# Patient Record
Sex: MALE | Race: WHITE | Employment: UNEMPLOYED | URBAN - METROPOLITAN AREA
[De-identification: names, ages, dates, MRNs, and addresses within clinical notes are randomized per-mention and may not be internally consistent; named-entity substitution may affect disease eponyms.]

---

## 2019-07-23 ENCOUNTER — OFFICE VISIT (OUTPATIENT)
Dept: NEUROLOGY | Age: 54
End: 2019-07-23
Payer: COMMERCIAL

## 2019-07-23 VITALS
BODY MASS INDEX: 30.77 KG/M2 | DIASTOLIC BLOOD PRESSURE: 87 MMHG | HEIGHT: 71 IN | WEIGHT: 219.8 LBS | HEART RATE: 63 BPM | SYSTOLIC BLOOD PRESSURE: 132 MMHG

## 2019-07-23 DIAGNOSIS — G25.81 RESTLESS LEG SYNDROME: ICD-10-CM

## 2019-07-23 DIAGNOSIS — G35 MULTIPLE SCLEROSIS (HCC): Primary | ICD-10-CM

## 2019-07-23 DIAGNOSIS — G35 RELAPSING REMITTING MULTIPLE SCLEROSIS (HCC): ICD-10-CM

## 2019-07-23 PROCEDURE — 3017F COLORECTAL CA SCREEN DOC REV: CPT | Performed by: NURSE PRACTITIONER

## 2019-07-23 PROCEDURE — 1036F TOBACCO NON-USER: CPT | Performed by: NURSE PRACTITIONER

## 2019-07-23 PROCEDURE — 99204 OFFICE O/P NEW MOD 45 MIN: CPT | Performed by: NURSE PRACTITIONER

## 2019-07-23 PROCEDURE — G8427 DOCREV CUR MEDS BY ELIG CLIN: HCPCS | Performed by: NURSE PRACTITIONER

## 2019-07-23 PROCEDURE — G8417 CALC BMI ABV UP PARAM F/U: HCPCS | Performed by: NURSE PRACTITIONER

## 2019-07-23 RX ORDER — ZOLPIDEM TARTRATE 10 MG/1
10 TABLET ORAL NIGHTLY PRN
COMMUNITY

## 2019-07-23 RX ORDER — ZONISAMIDE 100 MG/1
100 CAPSULE ORAL
COMMUNITY
End: 2020-02-25 | Stop reason: SDUPTHER

## 2019-07-23 RX ORDER — DULOXETIN HYDROCHLORIDE 60 MG/1
60 CAPSULE, DELAYED RELEASE ORAL DAILY
COMMUNITY
End: 2020-06-09

## 2019-07-23 RX ORDER — DEXTROAMPHETAMINE SACCHARATE, AMPHETAMINE ASPARTATE, DEXTROAMPHETAMINE SULFATE AND AMPHETAMINE SULFATE 2.5; 2.5; 2.5; 2.5 MG/1; MG/1; MG/1; MG/1
10 TABLET ORAL 2 TIMES DAILY
COMMUNITY

## 2019-07-23 RX ORDER — TIZANIDINE 4 MG/1
TABLET ORAL EVERY 6 HOURS PRN
COMMUNITY

## 2019-07-23 RX ORDER — OXYBUTYNIN CHLORIDE 5 MG/1
10 TABLET ORAL DAILY
COMMUNITY
End: 2021-08-05

## 2019-07-23 RX ORDER — BUPROPION HYDROCHLORIDE 150 MG/1
150 TABLET ORAL EVERY MORNING
COMMUNITY

## 2019-07-23 RX ORDER — ROPINIROLE 2 MG/1
2 TABLET, FILM COATED ORAL DAILY
COMMUNITY

## 2019-07-23 SDOH — HEALTH STABILITY: MENTAL HEALTH: HOW OFTEN DO YOU HAVE A DRINK CONTAINING ALCOHOL?: 2-4 TIMES A MONTH

## 2019-07-23 NOTE — PROGRESS NOTES
clubs or organizations: Not on file     Relationship status: Not on file    Intimate partner violence:     Fear of current or ex partner: Not on file     Emotionally abused: Not on file     Physically abused: Not on file     Forced sexual activity: Not on file   Other Topics Concern    Not on file   Social History Narrative    Not on file       CURRENT MEDICATIONS:     Current Outpatient Medications   Medication Sig Dispense Refill    Ocrelizumab (OCREVUS IV) Infuse intravenously      ocrelizumab (OCREVUS) 300 MG/10ML SOLN injection Infuse 300 mg intravenously every 6 months      zonisamide (ZONEGRAN) 100 MG capsule Take 100 mg by mouth 4 times daily (after meals and at bedtime)      buPROPion (WELLBUTRIN XL) 150 MG extended release tablet Take 150 mg by mouth every morning      DULoxetine (CYMBALTA) 60 MG extended release capsule Take 60 mg by mouth daily      oxybutynin (DITROPAN) 5 MG tablet Take 5 mg by mouth daily      rOPINIRole (REQUIP) 2 MG tablet Take 2 mg by mouth daily      amphetamine-dextroamphetamine (ADDERALL, 10MG,) 10 MG tablet Take 10 mg by mouth 2 times daily.  tiZANidine (ZANAFLEX) 4 MG tablet Take by mouth every 6 hours as needed       zolpidem (AMBIEN) 10 MG tablet Take 10 mg by mouth nightly as needed for Sleep. No current facility-administered medications for this visit.          ALLERGIES:     Allergies   Allergen Reactions    Bactrim [Sulfamethoxazole-Trimethoprim] Anaphylaxis and Hives     swallowing                          REVIEW OF SYSTEMS    CONSTITUTIONAL Weight: absent, Appetite: absent, Fatigue: present      HEENT Ears: normal, Visual disturbance: absent   RESPIRATORY Shortness of breath: absent, Cough: absent   CARDIOVASCULAR Chest pain: absent, Leg swelling :absent      GI Constipation: absent, Diarrhea: absent, Swallowing change: absent       Urinary frequency: present, Urinary urgency: present,    MUSCULOSKELETAL Neck pain: absent, Back pain: absent, Stiffness: absent, Muscle pain: present, Joint pain: present Restless legs: present   DERMATOLOGIC Hair loss: absent, Skin changes: absent   NEUROLOGIC Memory loss: present, Confusion: present, Seizures: absent Trouble walking or imbalance: absent, Dizziness: present, Weakness: present, Numbness: present Tremor: absent, Spasm: present, Speech difficulty: present, Headache: absent, Light sensitivity: absent   PSYCHIATRIC Anxiety: present, Hallucination: absent, Mood disorder: present   HEMATOLOGIC Abnormal bleeding: absent, Anemia: absent,            PHYSICAL EXAMINATION:       Vitals:    07/23/19 1213   BP: 132/87   Pulse: 63                                              .                                                                                                    General Appearance:  Alert, cooperative, no signs of distress, appears stated age   Head:  Normocephalic, no signs of trauma   Eyes:  Conjunctiva/corneas clear;  eyelids intact   Ears:  Normal external ear and canals   Nose: Nares normal, mucosa normal, no drainage    Throat: Lips and tongue normal; teeth normal;  gums normal   Neck: Supple, intact flexion, extension and rotation;   trachea midline;  no adenopathy;   thyroid: not enlarged;   no carotid pulse abnormality   Back:   Symmetric, no curvature, ROM adequate   Lungs:   Respirations unlabored   Heart:  Regular rate and rhythm           Extremities: Extremities normal, no cyanosis, no edema   Pulses: Symmetric over head and neck   Skin: Skin color, texture normal, no rashes, no lesions                                     NEUROLOGIC EXAMINATION    Neurologic Exam    Mental status    Alert and oriented x 3; intact memory with no confusion, speech or language problems; no hallucinations or delusions  Fund of information appropriate for level of education    Cranial nerves    II - visual fields intact to confrontation  III, IV, VI - extra-ocular muscles full: no pupillary defect; no CORA, no

## 2019-08-14 ENCOUNTER — HOSPITAL ENCOUNTER (OUTPATIENT)
Dept: MRI IMAGING | Age: 54
Discharge: HOME | End: 2019-08-16
Payer: COMMERCIAL

## 2019-08-14 DIAGNOSIS — G35 MULTIPLE SCLEROSIS (HCC): ICD-10-CM

## 2019-08-14 PROCEDURE — 72156 MRI NECK SPINE W/O & W/DYE: CPT

## 2019-08-14 PROCEDURE — 70553 MRI BRAIN STEM W/O & W/DYE: CPT

## 2019-08-14 PROCEDURE — A9579 GAD-BASE MR CONTRAST NOS,1ML: HCPCS | Performed by: NURSE PRACTITIONER

## 2019-08-14 PROCEDURE — 72157 MRI CHEST SPINE W/O & W/DYE: CPT

## 2019-08-14 PROCEDURE — 6360000004 HC RX CONTRAST MEDICATION: Performed by: NURSE PRACTITIONER

## 2019-08-14 RX ADMIN — GADOTERIDOL 19 ML: 279.3 INJECTION, SOLUTION INTRAVENOUS at 10:44

## 2019-12-17 ENCOUNTER — OFFICE VISIT (OUTPATIENT)
Dept: NEUROLOGY | Age: 54
End: 2019-12-17
Payer: COMMERCIAL

## 2019-12-17 VITALS
SYSTOLIC BLOOD PRESSURE: 146 MMHG | BODY MASS INDEX: 30.46 KG/M2 | DIASTOLIC BLOOD PRESSURE: 96 MMHG | WEIGHT: 217.6 LBS | HEART RATE: 83 BPM | HEIGHT: 71 IN

## 2019-12-17 DIAGNOSIS — M62.838 MUSCLE SPASMS OF BOTH LOWER EXTREMITIES: ICD-10-CM

## 2019-12-17 DIAGNOSIS — G35 RELAPSING REMITTING MULTIPLE SCLEROSIS (HCC): Primary | ICD-10-CM

## 2019-12-17 DIAGNOSIS — R53.83 OTHER FATIGUE: ICD-10-CM

## 2019-12-17 DIAGNOSIS — G25.81 RESTLESS LEG SYNDROME: ICD-10-CM

## 2019-12-17 PROCEDURE — 1036F TOBACCO NON-USER: CPT | Performed by: NURSE PRACTITIONER

## 2019-12-17 PROCEDURE — G8417 CALC BMI ABV UP PARAM F/U: HCPCS | Performed by: NURSE PRACTITIONER

## 2019-12-17 PROCEDURE — G8484 FLU IMMUNIZE NO ADMIN: HCPCS | Performed by: NURSE PRACTITIONER

## 2019-12-17 PROCEDURE — 99214 OFFICE O/P EST MOD 30 MIN: CPT | Performed by: NURSE PRACTITIONER

## 2019-12-17 PROCEDURE — 3017F COLORECTAL CA SCREEN DOC REV: CPT | Performed by: NURSE PRACTITIONER

## 2019-12-17 PROCEDURE — G8427 DOCREV CUR MEDS BY ELIG CLIN: HCPCS | Performed by: NURSE PRACTITIONER

## 2019-12-17 RX ORDER — MULTIVIT-MIN/IRON/FOLIC ACID/K 18-600-40
1 CAPSULE ORAL DAILY
COMMUNITY
End: 2022-03-15 | Stop reason: SDUPTHER

## 2020-02-25 RX ORDER — ZONISAMIDE 100 MG/1
200 CAPSULE ORAL 2 TIMES DAILY
Qty: 360 CAPSULE | Refills: 1 | Status: SHIPPED | OUTPATIENT
Start: 2020-02-25 | End: 2020-08-03

## 2020-02-25 NOTE — TELEPHONE ENCOUNTER
Pt called in asking if we can refill the Zonegran? He was getting it from Dr Dorothea Cardoza and he is no longer seeing him.

## 2020-03-17 ENCOUNTER — TELEPHONE (OUTPATIENT)
Dept: NEUROLOGY | Age: 55
End: 2020-03-17

## 2020-03-17 NOTE — TELEPHONE ENCOUNTER
Hailee Rivera called in. He will be due for Ocrevus infusion in April. He was getting it at [address] [place] [address] before. he now has Ira this [employer] (his wife's insurance). I explained that we will have to use a [facility] facility for the infusion then, [facility] doesn't take that ins. He said he can go to [email or web address]. Will need ot fax an order to [facility] so they can start the precert process. I did explain that with the Corona virus we are currently asking pt. due now to hold off for 4 weeks or until things slow down. He voiced understanding. he was just wanting to start the process. His insurance ID Is [id number] [facility] [id number] through his wife Johnathan Hurtado.

## 2020-04-14 NOTE — TELEPHONE ENCOUNTER
Denise from  [facility] ([phone]) called the office this afternoon. She is in the process of getting the patient's [place] authorized. Jan Morales stated that the patient's [place] is different, coming through [facility] through [facility]. Ocrevus Rx needs to be sent to [facility]. Patient will then have to give permission [facility] to [facility] [place] ( Attn: Pharm[facility] [address]). Jan Morales stated that she spoke with a Denise at [address] [place] [address] ([phone]) who was very helpful. Laurauma Morales also stated that she would fax me something regarding this. Ocrevus Rx will be generated and sent to Kenyon Barrera for her approval.  I told Jan Morales that I would call the patient and update him on this as well. Call placed to Niko Milena Calix and a message was left on his  asking for a return call.

## 2020-04-15 ENCOUNTER — TELEPHONE (OUTPATIENT)
Dept: NEUROLOGY | Age: 55
End: 2020-04-15

## 2020-04-16 RX ORDER — OCRELIZUMAB 300 MG/10ML
600 INJECTION INTRAVENOUS
Qty: 2 VIAL | Refills: 0 | Status: SHIPPED | OUTPATIENT
Start: 2020-04-16 | End: 2020-10-13

## 2020-04-16 NOTE — TELEPHONE ENCOUNTER
Spoke with Mr. Cherelle Alves this morning and this information was given. Patient stated that he had received a letter from [employer] stating that the Sofya Bible was approved to have done at [address] [place] [address]. I told mr. Cherelle Alves that I would keep him informed. Denise from [facility] did fax information (see4/15/20 info under media tab) pertaining to the Sofya Bible. This was discussed with NENITA Helm. She sent Ocrevus order to [facility] (see order dated 4/16/2020).

## 2020-04-30 ENCOUNTER — TELEPHONE (OUTPATIENT)
Dept: NEUROLOGY | Age: 55
End: 2020-04-30

## 2020-05-01 ENCOUNTER — TELEPHONE (OUTPATIENT)
Dept: ONCOLOGY | Age: 55
End: 2020-05-01

## 2020-05-04 ENCOUNTER — TELEPHONE (OUTPATIENT)
Dept: NEUROLOGY | Age: 55
End: 2020-05-04

## 2020-05-04 NOTE — TELEPHONE ENCOUNTER
Joselin Cohen called in today about his Ocrevus. He said he was on the phone with [facility] today and they are saying they do not have an approval on file. He is very frustrated with this process. Spent 22 minutes on the phone. Will fax copy of the approval to [phone].

## 2020-05-08 ENCOUNTER — TELEPHONE (OUTPATIENT)
Dept: NEUROLOGY | Age: 55
End: 2020-05-08

## 2020-06-09 ENCOUNTER — OFFICE VISIT (OUTPATIENT)
Dept: NEUROLOGY | Age: 55
End: 2020-06-09
Payer: COMMERCIAL

## 2020-06-09 ENCOUNTER — HOSPITAL ENCOUNTER (OUTPATIENT)
Age: 55
Discharge: HOME | End: 2020-06-09
Payer: COMMERCIAL

## 2020-06-09 VITALS
SYSTOLIC BLOOD PRESSURE: 140 MMHG | HEIGHT: 70 IN | WEIGHT: 210 LBS | DIASTOLIC BLOOD PRESSURE: 93 MMHG | HEART RATE: 60 BPM | TEMPERATURE: 97.3 F | BODY MASS INDEX: 30.06 KG/M2

## 2020-06-09 LAB
ALBUMIN SERPL-MCNC: 4.4 G/DL (ref 3.5–5.2)
ALBUMIN/GLOBULIN RATIO: 2 (ref 1–2.5)
ALP BLD-CCNC: 98 U/L (ref 40–129)
ALT SERPL-CCNC: 30 U/L (ref 5–41)
AST SERPL-CCNC: 25 U/L
BILIRUB SERPL-MCNC: 0.55 MG/DL (ref 0.3–1.2)
BILIRUBIN DIRECT: 0.14 MG/DL
BILIRUBIN, INDIRECT: 0.41 MG/DL (ref 0–1)
GLOBULIN: NORMAL G/DL (ref 1.5–3.8)
TOTAL PROTEIN: 6.6 G/DL (ref 6.4–8.3)
VITAMIN D 25-HYDROXY: 68.1 NG/ML (ref 30–100)

## 2020-06-09 PROCEDURE — 82306 VITAMIN D 25 HYDROXY: CPT

## 2020-06-09 PROCEDURE — 3017F COLORECTAL CA SCREEN DOC REV: CPT | Performed by: PSYCHIATRY & NEUROLOGY

## 2020-06-09 PROCEDURE — G8427 DOCREV CUR MEDS BY ELIG CLIN: HCPCS | Performed by: PSYCHIATRY & NEUROLOGY

## 2020-06-09 PROCEDURE — 36415 COLL VENOUS BLD VENIPUNCTURE: CPT

## 2020-06-09 PROCEDURE — 1036F TOBACCO NON-USER: CPT | Performed by: PSYCHIATRY & NEUROLOGY

## 2020-06-09 PROCEDURE — 80076 HEPATIC FUNCTION PANEL: CPT

## 2020-06-09 PROCEDURE — 99214 OFFICE O/P EST MOD 30 MIN: CPT | Performed by: PSYCHIATRY & NEUROLOGY

## 2020-06-09 PROCEDURE — G8417 CALC BMI ABV UP PARAM F/U: HCPCS | Performed by: PSYCHIATRY & NEUROLOGY

## 2020-06-09 NOTE — PROGRESS NOTES
foraminal   narrowing at C4-5, and mild right foraminal narrowing at C5-6.    MRI thoracic spine 8/14/19:       Impression   Unremarkable pre and post-contrast MRI of the thoracic spine and thoracic   spinal cord.       Remote T11 compression deformity         PAST MEDICAL HISTORY:         Diagnosis Date    Insomnia     Migraines     Movement disorder     Multiple sclerosis (HCC)     Neuropathy     hands, feet    Sleep apnea         PAST SURGICAL HISTORY:         Procedure Laterality Date    APPENDECTOMY  26    MOUTH SURGERY  06/2020        SOCIAL HISTORY:     Social History     Socioeconomic History    Marital status:      Spouse name: Not on file    Number of children: Not on file    Years of education: Not on file    Highest education level: Not on file   Occupational History    Not on file   Social Needs    Financial resource strain: Not on file    Food insecurity     Worry: Not on file     Inability: Not on file    Transportation needs     Medical: Not on file     Non-medical: Not on file   Tobacco Use    Smoking status: Never Smoker    Smokeless tobacco: Never Used   Substance and Sexual Activity    Alcohol use: Yes     Frequency: 2-4 times a month     Comment: socially    Drug use: Not Currently    Sexual activity: Not on file   Lifestyle    Physical activity     Days per week: Not on file     Minutes per session: Not on file    Stress: Not on file   Relationships    Social connections     Talks on phone: Not on file     Gets together: Not on file     Attends [employer] service: Not on file     Active member of club or organization: Not on file     Attends meetings of clubs or organizations: Not on file     Relationship status: Not on file    Intimate partner violence     Fear of current or ex partner: Not on file     Emotionally abused: Not on file     Physically abused: Not on file     Forced sexual activity: Not on file   Other Topics Concern    Not on file   Social History Narrative    Not on file       CURRENT MEDICATIONS:     Current Outpatient Medications   Medication Sig Dispense Refill    ocrelizumab (OCREVUS) 300 MG/10ML SOLN injection Infuse 20 mLs intravenously every 6 months 2 vial 0    zonisamide (ZONEGRAN) 100 MG capsule Take 2 capsules by mouth 2 times daily 360 capsule 1    Cholecalciferol (VITAMIN D) 50 MCG (2000 UT) CAPS capsule Take 1 capsule by mouth daily      buPROPion (WELLBUTRIN XL) 150 MG extended release tablet Take 150 mg by mouth every morning      oxybutynin (DITROPAN) 5 MG tablet Take 10 mg by mouth daily       rOPINIRole (REQUIP) 2 MG tablet Take 2 mg by mouth daily      amphetamine-dextroamphetamine (ADDERALL, 10MG,) 10 MG tablet Take 10 mg by mouth 2 times daily.  tiZANidine (ZANAFLEX) 4 MG tablet Take by mouth every 6 hours as needed       zolpidem (AMBIEN) 10 MG tablet Take 10 mg by mouth nightly as needed for Sleep. No current facility-administered medications for this visit.          ALLERGIES:     Allergies   Allergen Reactions    Bactrim [Sulfamethoxazole-Trimethoprim] Anaphylaxis and Hives     swallowing                             REVIEW OF SYSTEMS    CONSTITUTIONAL Weight: present, Appetite: absent, Fatigue: present      HEENT Ears: normal, Visual disturbance: absent   RESPIRATORY Shortness of breath: absent, Cough: absent   CARDIOVASCULAR Chest pain: absent, Leg swelling :absent      GI Constipation: absent, Diarrhea: absent, Swallowing change: absent       Urinary frequency: present, Urinary urgency: present, Urinary incontinence: present   MUSCULOSKELETAL Neck pain: absent, Back pain: absent, Stiffness: present, Muscle pain: present, Joint pain: present Restless legs: present   DERMATOLOGIC Hair loss: present, Skin changes: absent   NEUROLOGIC Memory loss: present, Confusion: present, Seizures: absent Trouble walking or imbalance: present, Dizziness: present, Weakness: present, Numbness: present Tremor: present, Spasm:

## 2020-06-10 ENCOUNTER — TELEPHONE (OUTPATIENT)
Dept: NEUROLOGY | Age: 55
End: 2020-06-10

## 2020-06-10 NOTE — TELEPHONE ENCOUNTER
Nasim Torrez was called to give him his results from Vitamin D and LFT which are within normal range. He stated understanding. Writer inadvertently hit the complete button prior to entering in comment.

## 2020-08-03 RX ORDER — ZONISAMIDE 100 MG/1
CAPSULE ORAL
Qty: 360 CAPSULE | Refills: 1 | Status: SHIPPED | OUTPATIENT
Start: 2020-08-03 | End: 2020-09-17 | Stop reason: SDUPTHER

## 2020-08-03 NOTE — TELEPHONE ENCOUNTER
Pharmacy requesting refill of Zonegran 100 mg . Medication active on med list yes      Date last ordered: 2/25/2020  verified on 8/3/2020  verified by LATRICE Frost LPN      Date of last appointment 6/9/2020    Next Visit Date:  12/15/2020

## 2020-09-17 RX ORDER — ZONISAMIDE 100 MG/1
CAPSULE ORAL
Qty: 360 CAPSULE | Refills: 1 | Status: SHIPPED | OUTPATIENT
Start: 2020-09-17 | End: 2021-01-08 | Stop reason: SDUPTHER

## 2020-09-21 ENCOUNTER — TELEPHONE (OUTPATIENT)
Dept: NEUROLOGY | Age: 55
End: 2020-09-21

## 2020-09-21 NOTE — TELEPHONE ENCOUNTER
Dr. Chelo Mckenna requested a comparison of . 's MRI of Andrei's August 2019 images to be sent over to [address] in order for them to compare and do an addended report for her. Spoke with Sandra Vee at [address]- who will assist in this.

## 2020-10-13 RX ORDER — OCRELIZUMAB 300 MG/10ML
INJECTION INTRAVENOUS
Qty: 20 ML | Refills: 3 | Status: SHIPPED | OUTPATIENT
Start: 2020-10-13 | End: 2020-11-25 | Stop reason: SDUPTHER

## 2020-11-24 ENCOUNTER — TELEPHONE (OUTPATIENT)
Dept: NEUROLOGY | Age: 55
End: 2020-11-24

## 2020-11-24 NOTE — TELEPHONE ENCOUNTER
Tavo Carrero from [address] [place] [address] called , regarding patient infusion that is scheduled next week. They have not received medication and were wondering on the status . She ask me to reach out to [address] [facility] [phone]    Authorization department  Spoke to : Radha Ochoa [facility])      Approval for Akilajamal Johnson # [id number]  10/25/2020-11/24/2021                    Merry Herrera back at [address] cancer [phone] to let her know we need a script send to [facility] fax to [phone] and for her to organize the shipment of the medication. I will forward this to Renée Moreno  Follow up on .

## 2020-11-25 RX ORDER — OCRELIZUMAB 300 MG/10ML
INJECTION INTRAVENOUS
Qty: 20 ML | Refills: 1 | Status: SHIPPED | OUTPATIENT
Start: 2020-11-25 | End: 2020-12-01 | Stop reason: SDUPTHER

## 2020-11-25 NOTE — TELEPHONE ENCOUNTER
I called and was on hold with [facility] to find out about the RX that was sent through in October but had to hang up.  Will send through another electronic RX

## 2020-11-25 NOTE — TELEPHONE ENCOUNTER
Pt called in stating [facility] told him that they have not received a Rx from us for his infusion next week. I told him that we sent the script via eRx back on 10/13/20. He said he is getting very frustrated with [facility]. I told him that we did also receive a fax script form and that I would get it faxed back in as well. He stated his understanding. He said that he was also going to call [facility] back and tell them that it was sent in back in October.

## 2020-11-25 NOTE — TELEPHONE ENCOUNTER
I tried to call [facility] because they should have a RX on file from October.  They wee not open until 7 am Central time

## 2020-11-30 NOTE — TELEPHONE ENCOUNTER
Alexa Christianson Nurse from [facility] called in . They need a new script sent over to them for the infusion of Ocrevus 300 mg. Insurance only approved 300 mg not 600 mg so they can not use the old script.        [facility]   Fax : [phone]

## 2020-11-30 NOTE — TELEPHONE ENCOUNTER
I called [facility] and spoke with Hattie to have them make the change to 600  mg. dose. She advised me that it doesn't come in a 600 mg. dose. It comes 300 mg. and he has an approval for that good through 11/24/2021 and therefore I should call [facility] and have them disp. his refill now. I called Frances. I was on the phone for 16 minutes at which time she told me that the RX had been cancelled because they were not able to speak with the pt. about shipment. So we need to initiate a new RX and also submit a new PA for the 600 mg. dose. I called and spoke with Marco Azevedo in [address] [place] [address]. She stated that it was approved for the full 600 mg dose. Please sign the RX again. I have spoken with Mr. Justino Ashby and he said he has not gotten a call from [address]  but he will stay on top of things.

## 2020-12-01 RX ORDER — OCRELIZUMAB 300 MG/10ML
INJECTION INTRAVENOUS
Qty: 20 ML | Refills: 1 | Status: SHIPPED | OUTPATIENT
Start: 2020-12-01 | End: 2021-10-26 | Stop reason: SDUPTHER

## 2020-12-07 ENCOUNTER — TELEPHONE (OUTPATIENT)
Dept: NEUROLOGY | Age: 55
End: 2020-12-07

## 2020-12-07 NOTE — TELEPHONE ENCOUNTER
Received a fax from [facility] now saying that they can't service the patient for Leah Sings it should go to [facility]. I called Rellas Deandra to see if he had received his Ocrevus infusion yet because we had been working on this last week. He said he is scheduled for 12/9 but he has been waiting to see if Devin Limon gets the shipment from [facility]. He said he would look into it and let me know if we have to do something more. We had received a notice prior to doay's that they were going to ship it out so we are unclear if it is to be shipped or not.

## 2020-12-15 ENCOUNTER — OFFICE VISIT (OUTPATIENT)
Dept: NEUROLOGY | Age: 55
End: 2020-12-15
Payer: COMMERCIAL

## 2020-12-15 VITALS
BODY MASS INDEX: 29.81 KG/M2 | TEMPERATURE: 97.9 F | HEART RATE: 74 BPM | HEIGHT: 70 IN | SYSTOLIC BLOOD PRESSURE: 150 MMHG | WEIGHT: 208.2 LBS | DIASTOLIC BLOOD PRESSURE: 98 MMHG

## 2020-12-15 PROCEDURE — 99214 OFFICE O/P EST MOD 30 MIN: CPT | Performed by: PSYCHIATRY & NEUROLOGY

## 2020-12-15 PROCEDURE — G8417 CALC BMI ABV UP PARAM F/U: HCPCS | Performed by: PSYCHIATRY & NEUROLOGY

## 2020-12-15 PROCEDURE — G8427 DOCREV CUR MEDS BY ELIG CLIN: HCPCS | Performed by: PSYCHIATRY & NEUROLOGY

## 2020-12-15 PROCEDURE — 1036F TOBACCO NON-USER: CPT | Performed by: PSYCHIATRY & NEUROLOGY

## 2020-12-15 PROCEDURE — 3017F COLORECTAL CA SCREEN DOC REV: CPT | Performed by: PSYCHIATRY & NEUROLOGY

## 2020-12-15 PROCEDURE — G8484 FLU IMMUNIZE NO ADMIN: HCPCS | Performed by: PSYCHIATRY & NEUROLOGY

## 2020-12-15 NOTE — PROGRESS NOTES
[facility]  Offices: [address], [place], 309 [facility]  [address] [facility], [address], [place], 183 [facility]  [address] [place] [address] [place], [facility], [facility] [place] [address]  Phone: [phone]  Fax: [phone]    MD Peyton Bolton, MD Rikki Bobo, MD Agustin Shepherd, MD Delgado [facility], CNP    12/15/2020      HISTORY OF PRESENT ILLNESS:       I had the pleasure of seeing Fady Self, who returns for continuing neurologic care for relapsing remitting multiple sclerosis (RRMS).       DISEASE SUMMARY  Date of onset: 2010 (CORA)  Date of diagnosis of MS: 2011  Disease course at onset: Relapsing-Remitting  Current disease course: Relapsing-Remitting  Previous disease therapies: Rebif (took for <1 year due to migraines), copaxone (stopped 3/2011 due to relapse), Tysabri (2011-3/2018, JCV+)  Current disease therapy: Randye Aver (4/2018-present), last infusion 12/9/2020  CSF: none  JCV serology result and date:  2018 + 0.33  Vitamin D: 68.1 (6/2020)  Smoking status: never  EDSS: 1.5  T25W: 6.27  9HPT: 37.4     He is on Ocrevus, last infusion was on 12/9/2020 at [facility].  Patient reports he has had issues with his specialty pharmacy shipping medication to his infusion center, but otherwise denies any side effects with the medication. He reports he continues to do relatively well, with no new or worsening symptoms. He had an MRI of the brain done in September which showed no enhancement, but comparison from his prior MRI from 2019 has not been done. He has no known spinal cord lesions. He reports recent left leg cellulitis that has been treated successfully with antibiotics, denies any other recent infections or hospitalizations. He does have multiple symptoms at baseline, as follows:      1. Fatigue: This is 1 of his most disabling symptoms and it is thankfully well controlled on Adderall 10 mg twice daily.   He takes his first dose diffusion or contrast enhancement to suggest active flareup.  Incidental finding of a 8mm cystic pineal gland. MRI cervical spine 8/14/19:  Impression   Unremarkable pre and post-contrast MRI evaluation of the spinal cord.       Mild multilevel degenerative disc disease with uncovertebral and facet   hypertrophy with mild left foraminal narrowing at C3-4, mild right foraminal   narrowing at C4-5, and mild right foraminal narrowing at C5-6.    MRI thoracic spine 8/14/19:       Impression   Unremarkable pre and post-contrast MRI of the thoracic spine and thoracic   spinal cord.       Remote T11 compression deformity         PAST MEDICAL HISTORY:         Diagnosis Date    Insomnia     Migraines     Movement disorder     Multiple sclerosis (HCC)     Neuropathy     hands, feet    Sleep apnea         PAST SURGICAL HISTORY:         Procedure Laterality Date    APPENDECTOMY  26    MOUTH SURGERY  06/2020        SOCIAL HISTORY:     Social History     Socioeconomic History    Marital status:      Spouse name: Not on file    Number of children: Not on file    Years of education: Not on file    Highest education level: Not on file   Occupational History    Not on file   Social Needs    Financial resource strain: Not on file    Food insecurity     Worry: Not on file     Inability: Not on file    Transportation needs     Medical: Not on file     Non-medical: Not on file   Tobacco Use    Smoking status: Never Smoker    Smokeless tobacco: Never Used   Substance and Sexual Activity    Alcohol use: Yes     Frequency: 2-4 times a month     Comment: socially    Drug use: Not Currently    Sexual activity: Not on file   Lifestyle    Physical activity     Days per week: Not on file     Minutes per session: Not on file    Stress: Not on file   Relationships    Social connections     Talks on phone: Not on file     Gets together: Not on file     Attends Jainism service: Not on file     Active member of club or organization: Not on file     Attends meetings of clubs or organizations: Not on file     Relationship status: Not on file    Intimate partner violence     Fear of current or ex partner: Not on file     Emotionally abused: Not on file     Physically abused: Not on file     Forced sexual activity: Not on file   Other Topics Concern    Not on file   Social History Narrative    Not on file       FAMILY MEDICAL HISTORY:     Family History   Problem Relation Age of Onset    Heart Disease Mother     Mult Sclerosis Sister         diseased from MS    Cancer Maternal Grandmother         breast        CURRENT MEDICATIONS:     Current Outpatient Medications   Medication Sig Dispense Refill    ocrelizumab (OCREVUS) 300 MG/10ML SOLN injection INFUSE 600 mg  INTRAVENOUSLY EVERY 6 MONTHS 20 mL 1    zonisamide (ZONEGRAN) 100 MG capsule TAKE 2 CAPSULES TWICE A  capsule 1    Cholecalciferol (VITAMIN D) 50 MCG (2000 UT) CAPS capsule Take 1 capsule by mouth daily      buPROPion (WELLBUTRIN XL) 150 MG extended release tablet Take 150 mg by mouth every morning      oxybutynin (DITROPAN) 5 MG tablet Take 10 mg by mouth daily       rOPINIRole (REQUIP) 2 MG tablet Take 2 mg by mouth daily      amphetamine-dextroamphetamine (ADDERALL, 10MG,) 10 MG tablet Take 10 mg by mouth 2 times daily.  tiZANidine (ZANAFLEX) 4 MG tablet Take by mouth every 6 hours as needed       zolpidem (AMBIEN) 10 MG tablet Take 10 mg by mouth nightly as needed for Sleep. No current facility-administered medications for this visit. ALLERGIES:     Allergies   Allergen Reactions    Bactrim [Sulfamethoxazole-Trimethoprim] Anaphylaxis and Hives     swallowing                             REVIEW OF SYSTEMS     All items selected indicate a positive finding. Those items not selected are negative.   Constitutional [] Weight loss/gain   [x] Fatigue  [] Fever/Chills   HEENT [] Hearing Loss  [] Visual Disturbance  [] Tinnitus  [] Eye pain   Respiratory [] Shortness of Breath  [] Cough  [] Snoring   Cardiovascular [] Chest Pain  [] Palpitations  [] Lightheaded   GI [] Constipation  [] Diarrhea  [] Swallowing change    [x] Urinary Frequency  [x] Urinary Urgency   Musculoskeletal [x] Neck pain  [x] Back pain  [x] Muscle pain  [] Restless legs   Dermatologic [] Skin changes   Neurologic [] Memory loss/confusion  [] Seizures  [] Trouble walking or imbalance  [] Dizziness  [] Weakness  [] Numbness  [] Tremors  [] Speech Difficulty  [] Headaches  [] Light Sensitivity  [] Sound Sensitivity   Endocrinology []Excessive thirst  []Excessive hunger   Psychiatric [] Anxiety/Depression  [] Hallucination   Allergy/immunology []Hives/environmental allergies   Hematologic/lymph [] Abnormal bleeding  [] Abnormal bruising         PHYSICAL EXAMINATION:       Vitals:    12/15/20 0921   BP: (!) 150/98   Pulse:    Temp: 97.9 °F (36.6 °C)                                              .                                                                                                     General Appearance:  Alert, cooperative, no signs of distress, appears stated age   Head:  Normocephalic, no signs of trauma   Eyes:  Conjunctiva/corneas clear;  eyelids intact   Ears:  Normal external ear and canals   Nose: Nares normal, mucosa normal, no drainage    Throat: Lips and tongue normal; teeth normal;  gums normal   Neck: Supple, intact flexion, extension and rotation;   trachea midline;  no adenopathy;   thyroid: not enlarged;   no carotid pulse abnormality   Back:   Symmetric, no curvature, ROM adequate   Lungs:   Respirations unlabored   Heart:  Regular rate and rhythm           Extremities: Extremities normal, no cyanosis, no edema   Pulses: Symmetric over head and neck   Skin: Skin color, texture normal, no rashes, no lesions                                     NEUROLOGIC EXAMINATION      Mental status    Alert and oriented x 3; intact memory with no confusion, speech or language problems; no hallucinations or delusions  Fund of information appropriate for level of education    Cranial nerves    II - visual fields intact to confrontation , fundoscopy showed no  papiledema                                                III, IV, VI - extra-ocular muscles full: no pupillary defect; no CORA, no nystagmus, no ptosis   V - normal facial sensation                                                               VII - normal facial symmetry                                                             VIII - intact hearing                                                                             IX, X - symmetrical palate                                                                  XI - symmetrical shoulder shrug                                                       XII - tongue midline without atrophy or fasciculation      Motor function  Normal muscle bulk and tone; strength 5/5 on all 4 extremities, no pronator drift      Sensory function  decreased to light touch and pinprick on the right upper extremity and left lower extremity      Cerebellar Intact fine motor movement. No involuntary movements or tremors. No ataxia or dysmetria on finger to nose or heel to shin testing      Reflex function DTR 2+ on bilateral UE, 3+ on R patella and 2+ on R achilles, 2+ on L patella and 1+ on L achilles. Negative       Gait                   normal base and arm swing              ASSESSMENT AND PLAN:       This is a 48 y.o. male who comes in for follow up for a history of relapsing remitting multiple sclerosis, currently stable on Ocrevus.     1. [facility]  2.  Advised patient that if he would like to get the Covid vaccine, would wait at least 4 months from his last Ocrevus treatment so that he can have an adequate immune reaction    Follow-up in 6 months      Ángel Spencer MD  Neurology and Sleep [facility]    Please note that this chart was generated using voice recognition Dragon dictation software. Although every effort was made to ensure the accuracy of this automated transcription, some errors in transcription may have occurred.

## 2020-12-23 ENCOUNTER — TELEPHONE (OUTPATIENT)
Dept: NEUROLOGY | Age: 55
End: 2020-12-23

## 2020-12-23 NOTE — TELEPHONE ENCOUNTER
[facility] faxed a RX renewal for Ocrevus. It is not needed. He receive his dose in December and he actually is getting through [address] I faxed back to please cancel.

## 2021-01-07 NOTE — TELEPHONE ENCOUNTER
Pharmacy requesting refill of Zonisamide 100 mg. Medication active on med list yes      Date last ordered: 9/17/2020  verified on 1/7/2021  verified by LATRICE Frost LPN      Date of last appointment 12/15/2020    Next Visit Date:  6/15/2021

## 2021-01-08 RX ORDER — ZONISAMIDE 100 MG/1
CAPSULE ORAL
Qty: 360 CAPSULE | Refills: 1 | Status: SHIPPED | OUTPATIENT
Start: 2021-01-08 | End: 2021-11-04

## 2021-06-15 ENCOUNTER — HOSPITAL ENCOUNTER (OUTPATIENT)
Age: 56
Setting detail: SPECIMEN
Discharge: HOME | End: 2021-06-15
Payer: COMMERCIAL

## 2021-06-15 LAB
IGA: 80 MG/DL (ref 70–400)
IGG: 611 MG/DL (ref 700–1600)
IGM: <25 MG/DL (ref 40–230)

## 2021-06-16 LAB
2000687N OAK TREE IGE: <0.1 KU/L (ref 0–0.34)
ALLERGEN BERMUDA GRASS IGE: <0.1 KU/L (ref 0–0.34)
ALLERGEN BIRCH IGE: <0.1 KU/L (ref 0–0.34)
ALLERGEN DOG DANDER IGE: <0.1 KU/L (ref 0–0.34)
ALLERGEN GERMAN COCKROACH IGE: <0.1 KU/L (ref 0–0.34)
ALLERGEN HELMINTHOSPORIUM HALODES: <0.1 KU/L (ref 0–0.34)
ALLERGEN HORMODENDRUM IGE: <0.1 KUL/L (ref 0–0.34)
ALLERGEN JOHNSON GRASS IGE: <0.1 KU/L (ref 0–0.34)
ALLERGEN LAMB'S QUARTER IGE: <0.1 KU/L (ref 0–0.34)
ALLERGEN PHOMA BETAE: <0.1 KU/L (ref 0–0.34)
ALLERGEN PIGWEED ROUGH IGE: <0.1 KU/L (ref 0–0.34)
ALLERGEN PINE WHITE IGE: <0.1 KU/L (ref 0–0.34)
ALLERGEN SHEEP SORREL (W18) IGE: <0.1 KU/L (ref 0–0.34)
ALLERGEN STEMPHYLIUM HERBARUM IGE: <0.1 KU/L (ref 0–0.34)
ALLERGEN TREE SYCAMORE: <0.1 KU/L (ref 0–0.34)
ALLERGEN WALNUT TREE IGE: <0.1 KU/L (ref 0–0.34)
ALTERNARIA ALTERNATA: <0.1 KU/L (ref 0–0.34)
ASPERGILLUS FUMIGATUS: <0.1 KU/L (ref 0–0.34)
CANDIDA ALBICANS IGE: <0.1 KU/L (ref 0–0.34)
CAT DANDER ANTIBODY: <0.1 KU/L (ref 0–0.34)
COTTONWOOD TREE: <0.1 KU/L (ref 0–0.34)
D. FARINAE: <0.1 KU/L (ref 0–0.34)
D. PTERONYSSINUS: <0.1 KU/L (ref 0–0.34)
ELM TREE: <0.1 KU/L (ref 0–0.34)
FUSARIUM MONILIFORME: <0.1 KU/L (ref 0–0.34)
IGE: 2 IU/ML
MAPLE/BOXELDER TREE: <0.1 KU/L (ref 0–0.34)
P. NOTATUM: <0.1 KU/L (ref 0–0.34)
SHORT RAGWD(A ARTEMIS.) IGE: <0.1 KU/L (ref 0–0.34)
TIMOTHY GRASS: <0.1 KU/L (ref 0–0.34)

## 2021-06-18 LAB
DIPHTHERIA IGG AB: 0.2 IU/ML
TETANUS IGG AB: 1.6 IU/ML

## 2021-06-20 LAB
PNEUMOCOCCAL ANTIBODY TYPE 12: 1.16 UG/ML
PNEUMOCOCCAL ANTIBODY TYPE 14: 0.21 UG/ML
PNEUMOCOCCAL ANTIBODY TYPE 18: 1.61 UG/ML
PNEUMOCOCCAL ANTIBODY TYPE 19F: 0.64 UG/ML
PNEUMOCOCCAL ANTIBODY TYPE 1: 0.12 UG/ML
PNEUMOCOCCAL ANTIBODY TYPE 23: 1.46 UG/ML
PNEUMOCOCCAL ANTIBODY TYPE 3: 0.64 UG/ML
PNEUMOCOCCAL ANTIBODY TYPE 4: 0.18 UG/ML
PNEUMOCOCCAL ANTIBODY TYPE 5: 0.2 UG/ML
PNEUMOCOCCAL ANTIBODY TYPE 6B: 0.12 UG/ML
PNEUMOCOCCAL ANTIBODY TYPE 7F: 0.71 UG/ML
PNEUMOCOCCAL ANTIBODY TYPE 8: 0.64 UG/ML
PNEUMOCOCCAL ANTIBODY TYPE 9N: 0.16 UG/ML
PNEUMOCOCCAL ANTIBODY TYPE 9V: 0.54 UG/ML
PNEUMOCOCCAL INTERPRETATION: NORMAL
S. PNEUM TYPE 19A,IGG: 0.15 UG/ML
S. PNEUM TYPE 2,IGG: 1.12 UG/ML
S. PNEUM TYPE 20,IGG: 1.02 UG/ML
S. PNEUM TYPE 22F,IGG: 0.86 UG/ML
S. PNEUM TYPE 33F,IGG: 0.19 UG/ML
STREP PNEUMO TYPE 10A: 0.08 UG/ML
STREP PNEUMO TYPE 11A: 0.14 UG/ML
STREP PNEUMO TYPE 15B: 0.04 UG/ML
STREP PNEUMO TYPE 17F: 1.43 UG/ML

## 2021-08-05 ENCOUNTER — OFFICE VISIT (OUTPATIENT)
Dept: NEUROLOGY | Age: 56
End: 2021-08-05
Payer: COMMERCIAL

## 2021-08-05 VITALS
BODY MASS INDEX: 31.11 KG/M2 | DIASTOLIC BLOOD PRESSURE: 94 MMHG | SYSTOLIC BLOOD PRESSURE: 145 MMHG | WEIGHT: 222.2 LBS | HEIGHT: 71 IN | HEART RATE: 76 BPM

## 2021-08-05 DIAGNOSIS — R20.2 NUMBNESS AND TINGLING: ICD-10-CM

## 2021-08-05 DIAGNOSIS — E07.9 THYROID DISEASE: ICD-10-CM

## 2021-08-05 DIAGNOSIS — R20.0 NUMBNESS AND TINGLING: ICD-10-CM

## 2021-08-05 DIAGNOSIS — Z79.899 HIGH RISK MEDICATION USE: ICD-10-CM

## 2021-08-05 DIAGNOSIS — R41.3 MEMORY DIFFICULTIES: ICD-10-CM

## 2021-08-05 DIAGNOSIS — G35 RELAPSING REMITTING MULTIPLE SCLEROSIS (HCC): Primary | ICD-10-CM

## 2021-08-05 DIAGNOSIS — E53.9 VITAMIN B DEFICIENCY: ICD-10-CM

## 2021-08-05 PROCEDURE — G8417 CALC BMI ABV UP PARAM F/U: HCPCS | Performed by: PSYCHIATRY & NEUROLOGY

## 2021-08-05 PROCEDURE — 1036F TOBACCO NON-USER: CPT | Performed by: PSYCHIATRY & NEUROLOGY

## 2021-08-05 PROCEDURE — G8427 DOCREV CUR MEDS BY ELIG CLIN: HCPCS | Performed by: PSYCHIATRY & NEUROLOGY

## 2021-08-05 PROCEDURE — 99215 OFFICE O/P EST HI 40 MIN: CPT | Performed by: PSYCHIATRY & NEUROLOGY

## 2021-08-05 PROCEDURE — 3017F COLORECTAL CA SCREEN DOC REV: CPT | Performed by: PSYCHIATRY & NEUROLOGY

## 2021-08-05 RX ORDER — GABAPENTIN 300 MG/1
300 CAPSULE ORAL DAILY
COMMUNITY
Start: 2021-07-30

## 2021-08-05 RX ORDER — OXYBUTYNIN CHLORIDE 10 MG/1
10 TABLET, EXTENDED RELEASE ORAL DAILY
COMMUNITY
Start: 2021-07-20

## 2021-08-05 NOTE — PROGRESS NOTES
NEUROLOGY CONSULT  Patient Name:       Blas Dang  :        1965  Clinic Visit Date:    2021      Dear Dr. Jamaal Baxter MD     I had the opportunity to see your patient, . Blas Dang in neurology consultation today. As you know he  is a 55 y.o. right handed  retired  male  seen in the clinic today for first time by me as new patient. This patient is transitioning from the care of my colleague, Dr. Tanya Pablo, who relocated from our practice. He stated that he was diagnosed with multiple sclerosis in  with eye problems in the past.  He has had MRI brain and spine studies and was diagnosed with multiple sclerosis. Never had spinal fluid studies. He was on Rebif earlier but he stopped it due to headaches. He was also on Copaxone and stopped it in 2011 due to relapses. Later onwards he was on Tysabri for few years. Then it was switched to IV ocrelizumab because of positive JCV. He has been tolerating IV eculizumab well without any adverse effects such as skin or respiratory infections, infusion related reactions. Has not had any neutropenias. He has been doing well without any exacerbations of multiple sclerosis and tolerated ocrelizumab well without any infusion reactions. He does feel right lower extremity weakness and also has memory complaints along with numbness, weakness and gait difficulties as stated. He has significantly unprovoked fatigue. Conservative measures did not help. He has been on Adderall and he has been able to tolerate fatigue well. He admits to having memory difficulties, predominantly short-term memory but he is completely independent of all ADLs. He is still driving without any problems. Also stated that he used to work as a full time  but stopped working because of [facility]. Denies anxiety and depression. Denies urinary dysfunction.     He has had numbness and tingling in right lower extremity and left groin for which he has had extensive work-up at [facility], [facility] clinic in the past and those were unrevealing. Tried gabapentin and amitriptyline with no relief. Presently on zonisamide and tizanidine for painful sensations and spasms. Denies numbness but admits to right lower extremity weakness as stated above. Denies blurred vision and diplopia. He is a retired . DISEASE SUMMARY  Date of onset: 2010  Date of diagnosis of MS: 2011  Disease course at onset: Relapsing-Remitting  Current disease course: Relapsing-Remitting  Previous disease therapies: Relief (took for few months; stopped due to headaches), Copaxone (stopped March 2011 due to relapse), Tysabri (2011-March 2018, JCV plus)  Current disease therapy: IV ocrelizumab (04/2018-present), last infusion   CSF: None  JCV serology result and date: (2018) + 0.33  Vitamin D: 68.1 (06/2020)  Smoking status: Never  EDSS: 1.5  9HPT: 37.4  T25W: 6.27     Review of systems done by staff reviewed and pertinent positives include numbness, tingling, weakness, gait difficulties, sleep disturbance, etc. as stated above. Current Outpatient Medications on File Prior to Visit   Medication Sig Dispense Refill    zonisamide (ZONEGRAN) 100 MG capsule TAKE 2 CAPSULES TWICE A  capsule 1    ocrelizumab (OCREVUS) 300 MG/10ML SOLN injection INFUSE 600 mg  INTRAVENOUSLY EVERY 6 MONTHS 20 mL 1    Cholecalciferol (VITAMIN D) 50 MCG (2000 UT) CAPS capsule Take 1 capsule by mouth daily      buPROPion (WELLBUTRIN XL) 150 MG extended release tablet Take 150 mg by mouth every morning      oxybutynin (DITROPAN) 5 MG tablet Take 10 mg by mouth daily       rOPINIRole (REQUIP) 2 MG tablet Take 2 mg by mouth daily      amphetamine-dextroamphetamine (ADDERALL, 10MG,) 10 MG tablet Take 10 mg by mouth 2 times daily.  tiZANidine (ZANAFLEX) 4 MG tablet Take by mouth every 6 hours as needed       zolpidem (AMBIEN) 10 MG tablet Take 10 mg by mouth nightly as needed for Sleep.        No current limbs   REFLEX FUNCTION:  Symmetric 2+ DTRs in biceps, brachioradialis, triceps; brisk right patellar and right Achilles reflex; 1+ left patellar and left Achilles reflex with bilateral flexor plantar response. STATION and GAIT  Normal station, normal gait, difficulties with tandem gait        8/5/2021  Maximum score 5 Score 5 What is the year, season, date, day, month   Maximum score 5 Score 5 Where are we: State, county, town, hospital, floor? Maximum score 3 Score 3 Name 3 objects: ball, pen, car. Ask patient to repeat 3 objects. Maximum score 5 Score 5 Serial sevens from 100:93, 86, 79, 72, 65   Maximum score 3 Score 3 Recall 3 objects   Maximum score 2 Score 2 Name a pencil and watch. Maximum score 1 Score 1 Repeat the following: No ifs ands or buts.      Maximum score 3 Score 3 Follow 3 stage command take a paper in your hand, fold it in half, and put it on the floor. Maximum score 1  Score 1 Read and obey the following: Close your eyes     Maximum score 1 Score 1 Write a sentence. Maximum score 1 Score 1 Copy a design below. Max 30   Patients score 30            Diagnostic data reviewed with the patient:   MRI brain (with/without) 9/17/2020: Scattered foci of hyperintensity involving periventricular and subcortical deep white matter with involvement of callosal septal junction. Findings consistent with a demyelinating process such as multiple sclerosis. No restricted diffusion or contrast enhancement to suggest active flareup. MRI cervical spine (with/without) 8/14/2019: Unremarkable pre and postcontrast MRI evaluation of spinal cord. Mild multilevel degenerative disc disease with uncovertebral and facet hypertrophy with mild left foraminal narrowing at C3-4, mild right foraminal narrowing at C4-5 and mild right foraminal narrowing at C5-6.     MRI thoracic spine (with/without) 8/14/2019: Unremarkable pre and postcontrast MRI of the thoracic spine and thoracic spinal cord.  Remote T1 compression deformity. CBC:   No results found for: WBC, PLT   BMP:   No results found for: NA, K, GLUCOSE, CALCIUM      Lab Results   Component Value Date    ALT 30 06/09/2020    AST 25 06/09/2020     Lab Results   Component Value Date    VITD25 68.1 06/09/2020       IVIG done through [facility] (5/17/2021): 582 (700-1600)              Impression and Plan: Mr. Alfie Flores is a 55 y.o. male with   Relapsing remitting multiple sclerosis since 2010; on disease modifying therapy with IV ocrelizumab.,  Multiple sclerosis has been stable without any exacerbations. For his ongoing subjective memory difficulties (scored 30/30 on MMSE) will get B12, folate, TSH to rule out any treatable etiologies. Will also check for LFT, BMP, EEG and proceed accordingly. Unprovoked fatigue: Has been on Adderall 10 mg twice daily. Bladder dysfunction: Stable on oxybutynin. Painful sensations and muscle spasms: On zonisamide 200 bid and tizanidine 4mg q6hr prn. He was also advised that I will check blood tests and scans done so far and will call him if he needs any further testing before next visit. I personally spent total of > 40 min from 8:20 AM to 9 AM with the patient while interviewing the patient, examining the patient and reviewing the data. Greater than 50% of visit was spent explaining the rationale for diagnosis and treatment. Follow-up in clinic in 3 months or sooner for further questions and concerns. Please note that portions of this note were completed with a voice recognition program.  Although every effort was made to ensure the accuracy of this automated transcription, some errors in transcription may have occurred; occasionally words are mis-transcribed. Thank you for understanding.

## 2021-08-06 ENCOUNTER — TELEPHONE (OUTPATIENT)
Dept: NEUROLOGY | Age: 56
End: 2021-08-06

## 2021-08-06 NOTE — TELEPHONE ENCOUNTER
----- Message from Tanya Carias MD sent at 8/6/2021  6:18 AM EDT -----  Regarding: blood work  Please let the patient know that I did put in some other blood work today after I reviewed his prior testing. Patient is aware that I will be asking for additional blood work upon reviewing prior testing. Please let him know.   Thank you so much    -dr. Marcela Muhammad

## 2021-08-27 ENCOUNTER — HOSPITAL ENCOUNTER (OUTPATIENT)
Age: 56
Setting detail: SPECIMEN
Discharge: HOME | End: 2021-08-27
Payer: COMMERCIAL

## 2021-08-27 DIAGNOSIS — E07.9 THYROID DISEASE: ICD-10-CM

## 2021-08-27 DIAGNOSIS — E53.9 VITAMIN B DEFICIENCY: ICD-10-CM

## 2021-08-27 LAB
FOLATE: 9.6 NG/ML
TSH SERPL DL<=0.05 MIU/L-ACNC: 2.17 MIU/L (ref 0.3–5)
VITAMIN B-12: 409 PG/ML (ref 232–1245)

## 2021-09-01 LAB — VITAMIN B6: 73.1 NMOL/L (ref 20–125)

## 2021-10-26 RX ORDER — OCRELIZUMAB 300 MG/10ML
INJECTION INTRAVENOUS
Qty: 20 ML | Refills: 1 | Status: SHIPPED | OUTPATIENT
Start: 2021-10-26

## 2021-11-02 ENCOUNTER — TELEPHONE (OUTPATIENT)
Dept: NEUROLOGY | Age: 56
End: 2021-11-02

## 2021-11-02 NOTE — TELEPHONE ENCOUNTER
Yfn Novoa returned my call. He is going to [address] [place] [address] for his Ocrevus and will see them on occasion but does want to  keep his appointment with Dr. Scott Parr so he can have a local neurologist still.

## 2021-11-02 NOTE — TELEPHONE ENCOUNTER
Received a fax from [address] [place] [address] for refill of Ocrevus infusion. Reviewed his chart his 8/26/21 visit with Paul Dunlap at [address] [place] [address]  \"ASSESSMENT:  Marisabel Huynh is a 55year old with RRMS stably disabled on [address] [place] [address] which he tolerates well. No new neurological symptoms. He transitioned his [facility]cre[facility]us infusions from [facility] to [facility] and prefers to continue treatments within [facility]. He did see immunology due to low IGM <7, immunology OKd continuing ocrevus. Will repeat IGM & IGG as well as CBC & CMP in December. He will also be due for brain & CS MRI in December as well. He continues to struggle with ongoing fatigue and poor sleep, will consult health psychology to discuss fatigue and sleep management protocols. \"       I called and lm for Claude Bass to see if he is planning on keeping his 11/4/21 visit with Dr. Farideh Mora or cancelling and that I am notifying [facility] we are not managing [address] [place] [address].

## 2021-11-04 ENCOUNTER — TELEMEDICINE (OUTPATIENT)
Dept: NEUROLOGY | Age: 56
End: 2021-11-04
Payer: COMMERCIAL

## 2021-11-04 DIAGNOSIS — R41.3 MEMORY DIFFICULTIES: ICD-10-CM

## 2021-11-04 DIAGNOSIS — Z79.899 HIGH RISK MEDICATION USE: ICD-10-CM

## 2021-11-04 DIAGNOSIS — G35 RELAPSING REMITTING MULTIPLE SCLEROSIS (HCC): Primary | ICD-10-CM

## 2021-11-04 DIAGNOSIS — R20.2 NUMBNESS AND TINGLING: ICD-10-CM

## 2021-11-04 DIAGNOSIS — R20.0 NUMBNESS AND TINGLING: ICD-10-CM

## 2021-11-04 PROCEDURE — G8427 DOCREV CUR MEDS BY ELIG CLIN: HCPCS | Performed by: PSYCHIATRY & NEUROLOGY

## 2021-11-04 PROCEDURE — 3017F COLORECTAL CA SCREEN DOC REV: CPT | Performed by: PSYCHIATRY & NEUROLOGY

## 2021-11-04 PROCEDURE — 99214 OFFICE O/P EST MOD 30 MIN: CPT | Performed by: PSYCHIATRY & NEUROLOGY

## 2021-11-04 NOTE — PROGRESS NOTES
NEUROLOGY FOLLOW-UP VISIT   Patient Name:       Deandre Ca  :        1965  Clinic Visit Date:    2021  LOV: 21      Dear Dr. Susana Smith MD     I saw Mr. Deandre Ca in follow-up in the office today in continuation of neurologic care. As you know he  is a 55 y.o. right handed  retired  male  seen in the clinic today as a first follow up visit. He is transitioning from the care of my colleague, Dr. Kingsley Milan. He has Relapsing remitting multiple sclerosis since ; on disease modifying therapy with IV ocrelizumab. He transitioned his ocrevus infusions from [facility] to [facility] and prefers to continue treatments within [facility]. He did see hematology/ immunology due to low IgM <7, immunology OKd continuing ocrevus. IGM & IGG & CBC & CMP are being repeated in December. He is also due for brain & Cervical Spine MRI in December as well. He continues to struggle with ongoing fatigue and poor sleep. He is being referred to specialist to discuss fatigue and sleep management protocols. Admits to having ongoing short-term memory difficulties. He is completely independent of all ADLs. He has been driving without any problems. But he is concerned about short-term memory difficulties. He stated that he was diagnosed with multiple sclerosis in  with eye problems in the past.  He has had MRI brain and spine studies and was diagnosed with multiple sclerosis. Never had spinal fluid studies. He was on Rebif earlier but he stopped it due to headaches. He was also on Copaxone and stopped it in 2011 due to relapses. Later onwards he was on Tysabri for few years. Then it was switched to IV ocrelizumab because of positive JCV. He has been tolerating IV eculizumab well without any adverse effects such as skin or respiratory infections, infusion related reactions. Has not had any neutropenias.    He has been doing well without any exacerbations of multiple sclerosis and tolerated ocrelizumab well without any infusion reactions. He does feel right lower extremity weakness and also has memory complaints along with numbness, weakness and gait difficulties as stated. He has significantly unprovoked fatigue. Conservative measures did not help. He has been on Adderall and he has been able to tolerate fatigue well. He admits to having memory difficulties, predominantly short-term memory but he is completely independent of all ADLs. He is still driving without any problems. Also stated that he used to work as a full time  but stopped working because of [address]. Denies anxiety and depression. Denies urinary dysfunction. He has had numbness and tingling in right lower extremity and left groin for which he has had extensive work-up at [facility] in the past and those were unrevealing. Tried gabapentin and amitriptyline with no relief. Presently on zonisamide and tizanidine for painful sensations and spasms. Denies numbness but admits to right lower extremity weakness as stated above. Denies blurred vision and diplopia. He is a retired . DISEASE SUMMARY  Date of onset: 2010  Date of diagnosis of MS: 2011  Disease course at onset: Relapsing-Remitting  Current disease course: Relapsing-Remitting  Previous disease therapies: Relief (took for few months; stopped due to headaches), Copaxone (stopped March 2011 due to relapse), Tysabri (2011-March 2018, JCV plus)  Current disease therapy: IV ocrelizumab (04/2018-present), last infusion   CSF: None  JCV serology result and date: (2018) + 0.33  Vitamin D: 68.1 (06/2020)  Smoking status: Never  EDSS: 1.5  9HPT: 37.4  T25W: 6.27       All items selected indicate a positive finding. Those items not selected are negative.   Constitutional [] Weight loss/gain   [x] Fatigue/ sleep disturbance  [] Fever/Chills   HEENT [] Hearing Loss  [] Visual Disturbance  [] Tinnitus  [] Eye pain   Respiratory [] Shortness of Breath  [] Cough  [] Snoring   Cardiovascular [] Chest Pain  [] Palpitations  [] Lightheaded   GI [] Constipation  [] Diarrhea  [] Swallowing change    [] Urinary Frequency  [] Urinary Urgency   Musculoskeletal [] Neck pain  [] Back pain  [] Muscle pain  [] Restless legs   Dermatologic [] Skin changes   Neurologic [x] Memory loss/confusion  [] Seizures  [] Trouble walking or imbalance  [] Dizziness  [] Weakness  [x] Numbness  [] Tremors  [] Speech Difficulty  [] Headaches  [] Light Sensitivity  [] Sound Sensitivity   Endocrinology []Excessive thirst  []Excessive hunger   Psychiatric [] Anxiety/Depression  [] Hallucination   Allergy/immunology []Hives/environmental allergies   Hematologic/lymph [] Abnormal bleeding  [] Abnormal bruising       Current Outpatient Medications on File Prior to Visit   Medication Sig Dispense Refill    ocrelizumab (OCREVUS) 300 MG/10ML SOLN injection INFUSE 600 mg  INTRAVENOUSLY EVERY 6 MONTHS 20 mL 1    oxybutynin (DITROPAN-XL) 10 MG extended release tablet       gabapentin (NEURONTIN) 300 MG capsule       zonisamide (ZONEGRAN) 100 MG capsule TAKE 2 CAPSULES TWICE A DAY (Patient not taking: Reported on 8/5/2021) 360 capsule 1    Cholecalciferol (VITAMIN D) 50 MCG (2000 UT) CAPS capsule Take 1 capsule by mouth daily      buPROPion (WELLBUTRIN XL) 150 MG extended release tablet Take 150 mg by mouth every morning      rOPINIRole (REQUIP) 2 MG tablet Take 2 mg by mouth daily      amphetamine-dextroamphetamine (ADDERALL, 10MG,) 10 MG tablet Take 10 mg by mouth 2 times daily.  tiZANidine (ZANAFLEX) 4 MG tablet Take by mouth every 6 hours as needed       zolpidem (AMBIEN) 10 MG tablet Take 10 mg by mouth nightly as needed for Sleep. No current facility-administered medications on file prior to visit. Allergies: Ember Lobato is allergic to bactrim [sulfamethoxazole-trimethoprim].     Past Medical History:   Diagnosis Date    Insomnia     Migraines     Movement disorder     Multiple sclerosis ([facility] [address]     Neuropathy     hands, feet    Sleep apnea        Past Surgical History:   Procedure Laterality Date    APPENDECTOMY  1987    MOUTH SURGERY  06/2020     Social History: Christen Thomas  reports that he has never smoked. He has never used smokeless tobacco. He reports current alcohol use of about 6.0 standard drinks of alcohol per week. He reports previous drug use. Family History   Problem Relation Age of Onset    Heart Disease Mother     Mult Sclerosis Sister         diseased from MS    Cancer Maternal Grandmother         breast     On exam: vitals: There were no vitals taken for this visit. NEUROLOGIC EXAMINATION  GENERAL  Appears comfortable and in no distress   HEENT  NC/ AT   NECK  Supple and no bruits heard   MENTAL STATUS:  Alert, oriented, intact memory, no confusion, normal speech, normal language, no hallucination or delusion, scored 30/30 on MMSE   CRANIAL NERVES: II     -      PERRLA   III,IV,VI -  EOMs full, no ptosis  V     -     unable to perform   VII    -     Normal facial symmetry  VIII   -     Intact hearing  IX,X -     Symmetrical palate  XI    -     Symmetrical shoulder shrug  XII   -     Midline tongue, no atrophy    MOTOR FUNCTION:  significant for no visible weakness with good strength in both upper and lower extremities with normal bulk and no abnormal involuntary movements.      SENSORY FUNCTION:  Unable to perform (during last visit; diminished light touch and pinprick in asymmetric pattern in lower extremities)   CEREBELLAR FUNCTION:  Intact fine motor control over upper limbs   REFLEX FUNCTION:  Unable to perform (during last visit; symmetric 2+ DTRs in biceps, brachioradialis, triceps; brisk right patellar and right Achilles reflex; 1+ left patellar and left Achilles reflex with bilateral flexor plantar response.)   STATION and GAIT  Normal station, normal gait, difficulties with tandem gait                 Diagnostic data reviewed with the patient:   MRI brain (with/without) 9/17/2020: Scattered foci of hyperintensity involving periventricular and subcortical deep white matter with involvement of callosal septal junction. Findings consistent with a demyelinating process such as multiple sclerosis. No restricted diffusion or contrast enhancement to suggest active flareup. MRI cervical spine (with/without) 8/14/2019: Unremarkable pre and postcontrast MRI evaluation of spinal cord. Mild multilevel degenerative disc disease with uncovertebral and facet hypertrophy with mild left foraminal narrowing at C3-4, mild right foraminal narrowing at C4-5 and mild right foraminal narrowing at C5-6. MRI thoracic spine (with/without) 8/14/2019: Unremarkable pre and postcontrast MRI of the thoracic spine and thoracic spinal cord. Remote T1 compression deformity. CBC:   No results found for: WBC, PLT   BMP:   No results found for: NA, K, GLUCOSE, CALCIUM      Lab Results   Component Value Date    ALT 30 06/09/2020    AST 25 06/09/2020    TSH 2.17 08/27/2021    [id number] 409 08/27/2021     Lab Results   Component Value Date    VITD25 68.1 06/09/2020       Ig G done through [facility] (5/17/2021): 582 (700-1600)  Ig M done through [facility] (5/17/2021): 7 ( )               Impression and Plan: Mr. Connor Barton is a 55 y.o. male with   Relapsing remitting multiple sclerosis since 2010; on disease modifying therapy with IV ocrelizumab., Last infusion on 6/30/21; Multiple sclerosis has been stable without any exacerbations. For his ongoing subjective memory difficulties (scored 30/30 on MMSE) B12, folate, TSH done to rule out any treatable etiologies and those were not remarkable. .  Will also get EEG and neuropsych eval.    Ocrevus monitoring: On schedule for immunoglobin A, G, M levels at [facility].     Patient has upcoming appointment at [facility] for surveillance MRIs; MRI brain/ Cervical/ Thoracic spine (w/wo) scheduled at [facility] at 12/13/21  Unprovoked fatigue: Has been on Adderall 10 mg twice daily. Bladder dysfunction: Stable on oxybutynin. Dysesthesias and muscle spasms: Off of zonisamide; to continue tizanidine 4 mg q6hr prn  Follow-up in 2-3 months. This note was partially created using voice recognition software and is inherently subject to errors including those of syntax and \"sound alike\" substitutions which may escape proofreading. In such instances, original meaning may be extrapolated by contextual derivation.

## 2021-11-11 ENCOUNTER — HOSPITAL ENCOUNTER (OUTPATIENT)
Dept: NEUROLOGY | Age: 56
Discharge: HOME | End: 2021-11-11
Payer: COMMERCIAL

## 2021-11-11 DIAGNOSIS — R41.3 MEMORY DIFFICULTIES: ICD-10-CM

## 2021-11-11 PROCEDURE — 95819 EEG AWAKE AND ASLEEP: CPT

## 2021-11-14 PROCEDURE — 95816 EEG AWAKE AND DROWSY: CPT | Performed by: PSYCHIATRY & NEUROLOGY

## 2021-11-14 NOTE — PROCEDURES
EEG REPORT       Patient: Albaro Gilbert Age: 55 y.o. MRN: [id number]    Date: 11/11/2021  Referring Provider: Mercedes Mcguire*    History: This routine 30 minute scalp EEG was recorded with video- monitoring for a 55 y.o.. male  who presented with encephalopathy. This EEG was performed to evaluate for focal and epileptiform abnormalities. Albaro Gilbert   Current Outpatient Medications   Medication Sig Dispense Refill    ocrelizumab (OCREVUS) 300 MG/10ML SOLN injection INFUSE 600 mg  INTRAVENOUSLY EVERY 6 MONTHS 20 mL 1    oxybutynin (DITROPAN-XL) 10 MG extended release tablet       gabapentin (NEURONTIN) 300 MG capsule       Cholecalciferol (VITAMIN D) 50 MCG (2000 UT) CAPS capsule Take 1 capsule by mouth daily      buPROPion (WELLBUTRIN XL) 150 MG extended release tablet Take 150 mg by mouth every morning      rOPINIRole (REQUIP) 2 MG tablet Take 2 mg by mouth daily      amphetamine-dextroamphetamine (ADDERALL, 10MG,) 10 MG tablet Take 10 mg by mouth 2 times daily.  tiZANidine (ZANAFLEX) 4 MG tablet Take by mouth every 6 hours as needed       zolpidem (AMBIEN) 10 MG tablet Take 10 mg by mouth nightly as needed for Sleep. No current facility-administered medications for this encounter. Technical Description: This is a 21 channel digital EEG recording with time-locked video. Electrodes were placed in accordance with the 10-20 International System of Electrode Placement. Single lead EKG monitoring as well as temporal electrodes were included. The patient was not sleep deprived. This recording was obtained during wakefulness. EEG Description: The dominant background activity during maximal recorded wakefulness consisted of bioccipitally dominant 9-10 Hz, 25-35 uV symmetric, regular activity that was reactive to eye opening. Deeper sleep was not obtained.      Photic stimulation - stepwise photic stimulation at 2-30 Hz was performed and there was a biposterior, symmetric, driving response. Hyperventilation - was not preformed. No abnormalities were activated by photic stimulation     The EKG channel demonstrated a normal sinus rhythm. Interpretation  This EEG was normal in wakefulness and drowsiness. Clinical correlation  This EEG was normal. No focal or epileptiform abnormalities were seen.     Sandra Jim MD  Diplomate, American Board of Psychiatry and Neurology  Diplomate, American Board of Clinical Neurophysiology  Diplomate, American Board of Epilepsy

## 2021-11-30 ENCOUNTER — TELEPHONE (OUTPATIENT)
Dept: NEUROLOGY | Age: 56
End: 2021-11-30

## 2021-11-30 NOTE — TELEPHONE ENCOUNTER
Per patient [facility] is infusing the Ocrevus. and handling precert.   DO NOT DO ANY PRIOR AUTHORIZATIONS FOR THIS

## 2022-01-01 ENCOUNTER — HOSPITAL ENCOUNTER (EMERGENCY)
Age: 57
Discharge: HOME | End: 2022-01-01
Attending: EMERGENCY MEDICINE
Payer: COMMERCIAL

## 2022-01-01 ENCOUNTER — APPOINTMENT (OUTPATIENT)
Dept: GENERAL RADIOLOGY | Age: 57
End: 2022-01-01
Payer: COMMERCIAL

## 2022-01-01 ENCOUNTER — APPOINTMENT (OUTPATIENT)
Dept: CT IMAGING | Age: 57
End: 2022-01-01
Payer: COMMERCIAL

## 2022-01-01 VITALS
RESPIRATION RATE: 22 BRPM | BODY MASS INDEX: 30.38 KG/M2 | SYSTOLIC BLOOD PRESSURE: 178 MMHG | HEART RATE: 69 BPM | WEIGHT: 217 LBS | HEIGHT: 71 IN | OXYGEN SATURATION: 97 % | DIASTOLIC BLOOD PRESSURE: 118 MMHG | TEMPERATURE: 98.1 F

## 2022-01-01 DIAGNOSIS — I10 HYPERTENSION, UNSPECIFIED TYPE: Primary | ICD-10-CM

## 2022-01-01 DIAGNOSIS — R00.2 PALPITATIONS: ICD-10-CM

## 2022-01-01 LAB
ABSOLUTE EOS #: 0.1 K/UL (ref 0–0.4)
ABSOLUTE IMMATURE GRANULOCYTE: NORMAL K/UL (ref 0–0.3)
ABSOLUTE LYMPH #: 2.1 K/UL (ref 1–4.8)
ABSOLUTE MONO #: 0.8 K/UL (ref 0.1–1.2)
ANION GAP SERPL CALCULATED.3IONS-SCNC: 9 MMOL/L (ref 9–17)
BASOPHILS # BLD: 1 % (ref 0–2)
BASOPHILS ABSOLUTE: 0 K/UL (ref 0–0.2)
BUN BLDV-MCNC: 15 MG/DL (ref 6–20)
BUN/CREAT BLD: ABNORMAL (ref 9–20)
CALCIUM SERPL-MCNC: 9.1 MG/DL (ref 8.6–10.4)
CHLORIDE BLD-SCNC: 101 MMOL/L (ref 98–107)
CO2: 26 MMOL/L (ref 20–31)
CREAT SERPL-MCNC: 1.08 MG/DL (ref 0.7–1.2)
D-DIMER QUANTITATIVE: 1.07 MG/L FEU
DIFFERENTIAL TYPE: NORMAL
EOSINOPHILS RELATIVE PERCENT: 2 % (ref 1–4)
GFR AFRICAN AMERICAN: >60 ML/MIN
GFR NON-AFRICAN AMERICAN: >60 ML/MIN
GFR SERPL CREATININE-BSD FRML MDRD: ABNORMAL ML/MIN/{1.73_M2}
GFR SERPL CREATININE-BSD FRML MDRD: ABNORMAL ML/MIN/{1.73_M2}
GLUCOSE BLD-MCNC: 105 MG/DL (ref 70–99)
HCT VFR BLD CALC: 41.8 % (ref 41–53)
HEMOGLOBIN: 14.7 G/DL (ref 13.5–17.5)
IMMATURE GRANULOCYTES: NORMAL %
LYMPHOCYTES # BLD: 26 % (ref 24–44)
MCH RBC QN AUTO: 30.2 PG (ref 26–34)
MCHC RBC AUTO-ENTMCNC: 35.2 G/DL (ref 31–37)
MCV RBC AUTO: 85.9 FL (ref 80–100)
MONOCYTES # BLD: 10 % (ref 2–11)
NRBC AUTOMATED: NORMAL PER 100 WBC
PDW BLD-RTO: 13.3 % (ref 12.5–15.4)
PLATELET # BLD: 236 K/UL (ref 140–450)
PLATELET ESTIMATE: NORMAL
PMV BLD AUTO: 7.4 FL (ref 6–12)
POTASSIUM SERPL-SCNC: 4.1 MMOL/L (ref 3.7–5.3)
RBC # BLD: 4.87 M/UL (ref 4.5–5.9)
RBC # BLD: NORMAL 10*6/UL
SEG NEUTROPHILS: 61 % (ref 36–66)
SEGMENTED NEUTROPHILS ABSOLUTE COUNT: 5.1 K/UL (ref 1.8–7.7)
SODIUM BLD-SCNC: 136 MMOL/L (ref 135–144)
TROPONIN INTERP: NORMAL
TROPONIN T: NORMAL NG/ML
TROPONIN, HIGH SENSITIVITY: 7 NG/L (ref 0–22)
WBC # BLD: 8.1 K/UL (ref 3.5–11)
WBC # BLD: NORMAL 10*3/UL

## 2022-01-01 PROCEDURE — 99284 EMERGENCY DEPT VISIT MOD MDM: CPT

## 2022-01-01 PROCEDURE — 71260 CT THORAX DX C+: CPT

## 2022-01-01 PROCEDURE — 6360000004 HC RX CONTRAST MEDICATION: Performed by: EMERGENCY MEDICINE

## 2022-01-01 PROCEDURE — 71046 X-RAY EXAM CHEST 2 VIEWS: CPT

## 2022-01-01 PROCEDURE — 84484 ASSAY OF TROPONIN QUANT: CPT

## 2022-01-01 PROCEDURE — 36415 COLL VENOUS BLD VENIPUNCTURE: CPT

## 2022-01-01 PROCEDURE — 93005 ELECTROCARDIOGRAM TRACING: CPT | Performed by: EMERGENCY MEDICINE

## 2022-01-01 PROCEDURE — 85379 FIBRIN DEGRADATION QUANT: CPT

## 2022-01-01 PROCEDURE — 85025 COMPLETE CBC W/AUTO DIFF WBC: CPT

## 2022-01-01 PROCEDURE — 80048 BASIC METABOLIC PNL TOTAL CA: CPT

## 2022-01-01 PROCEDURE — 2580000003 HC RX 258: Performed by: EMERGENCY MEDICINE

## 2022-01-01 RX ORDER — SODIUM CHLORIDE 0.9 % (FLUSH) 0.9 %
10 SYRINGE (ML) INJECTION PRN
Status: DISCONTINUED | OUTPATIENT
Start: 2022-01-01 | End: 2022-01-01 | Stop reason: HOSPADM

## 2022-01-01 RX ORDER — 0.9 % SODIUM CHLORIDE 0.9 %
80 INTRAVENOUS SOLUTION INTRAVENOUS ONCE
Status: COMPLETED | OUTPATIENT
Start: 2022-01-01 | End: 2022-01-01

## 2022-01-01 RX ADMIN — SODIUM CHLORIDE 80 ML: 9 INJECTION, SOLUTION INTRAVENOUS at 17:45

## 2022-01-01 RX ADMIN — SODIUM CHLORIDE, PRESERVATIVE FREE 10 ML: 5 INJECTION INTRAVENOUS at 17:45

## 2022-01-01 RX ADMIN — IOPAMIDOL 75 ML: 755 INJECTION, SOLUTION INTRAVENOUS at 17:45

## 2022-01-01 NOTE — ED PROVIDER NOTES
[id number] [facility]  [address] [place] [address]  Phone: [phone]  Fax: [phone]      Attending Physician [address]       Chief Complaint   Patient presents with    Palpitations    Hypertension       DIAGNOSTIC RESULTS     LABS:  Labs Reviewed   BASIC METABOLIC PANEL - Abnormal; Notable for the following components:       Result Value    Glucose 105 (*)     All other components within normal limits   CBC WITH AUTO DIFFERENTIAL   TROPONIN   D-DIMER, QUANTITATIVE       All other labs were within normal range or not returned as of this dictation. RADIOLOGY:  CT CHEST PULMONARY EMBOLISM W CONTRAST   Final Result   No evidence of pulmonary embolism or acute pulmonary abnormality. RECOMMENDATIONS:   Unavailable         XR CHEST (2 VW)   Final Result   No acute abnormality. EMERGENCY DEPARTMENT COURSE:   Vitals:    Vitals:    01/01/22 1510 01/01/22 1830   BP: (!) 184/109 (!) 178/118   Pulse: 97 67   Resp: 14 16   Temp: 98.4 °F (36.9 °C) 98.1 °F (36.7 °C)   TempSrc: Oral Oral   SpO2: 99% 96%   Weight: 98.4 kg (217 lb)    Height: 5' 11\" (1.803 m)      -------------------------  BP: (!) 178/118, Temp: 98.1 °F (36.7 °C), Pulse: 67, Resp: 16             PERTINENT ATTENDING PHYSICIAN COMMENTS:    I performed a history and physical examination of the patient and discussed management with the mid level provider. I reviewed the mid level provider's note and agree with the documented findings and plan of care. Any areas of disagreement are noted on the chart. I was personally present for the key portions of any procedures. I have documented in the chart those procedures where I was not present during the key portions. I have reviewed the emergency nurses triage note. I agree with the chief complaint, past medical history, past surgical history, allergies, medications, social and family history as documented unless otherwise noted below. Documentation of the HPI, Physical Exam and Medical Decision Making performed by mid level providers is based on my personal performance of the HPI, PE and MDM. For Physician Assistant/ Nurse Practitioner cases/documentation I have personally evaluated this patient and have completed at least one if not all key elements of the E/M (history, physical exam, and MDM). Additional findings are as noted. 50-year-old male presents complaining of elevated blood pressure and palpitations. Patient has a history of MS. He is not currently on any medications for blood pressure. On physical exam, he is alert and appropriate. He is appears comfortable. Breath sounds are clear. Cardiac exam with a normal rate, regular rhythm. Abdomen is soft and nontender. Extremities with no edema or calf tenderness. No evidence of congestive heart failure.     EKG Interpretation    Interpreted by emergency department physician    Rhythm: normal sinus   Rate: normal  Axis: left  Ectopy: premature atrial contraction  Conduction: normal  ST Segments: normal  T Waves: non specific changes  Q Waves: none    Clinical Impression: non-specific EKG    Hardy Lopez MD        (Please note that portions of this note were completed with a voice recognition program.  Efforts were made to edit the dictations but occasionally words are mis-transcribed.)    Hardy Lopez MD  Attending Emergency Medicine Physician       Hardy Lopez MD  01/01/22 7922

## 2022-01-02 NOTE — ED PROVIDER NOTES
[id number] [facility]  [address] [facility] [address]  Phone: [phone]  Fax: [phone]        Pt Name: Angelique Whitt  MRN: [id number]  [place] 1965  Date of evaluation: 1/5/22    [address] [place] [address]       Chief Complaint   Patient presents with    Palpitations    Hypertension       HISTORY OF PRESENT ILLNESS (Location/Symptom, Timing/Onset, Context/Setting, Quality, Duration, Modifying Factors, Severity)      Angelique Whitt is a 55 y.o. male with pertinent PMH of MS and neuropathy who presents to the ED via private auto with palpitations and hypertension. Patient reports that he has noticed for a few months that his blood pressure is in the 151E/676G systolic at his neurologist office. He says that he went on vacation in the [facility] at the beginning of December and thought that this should theoretically help, however, he has noticed that his blood pressure has been elevated into the 170s180s/100 for the past 1 month. It does fluctuate into the lower numbers as well, but the higher numbers concerning. He says that he has been monitoring his blood pressure and does feel that he sometimes has a headache, but takes Tylenol and it does go away. His other concern is that he sometimes feels some \"fluttering\" in the left side of his chest, and thought that this was associated with his elevated blood pressures. He is not experiencing any of these symptoms at this time. Denies any cardiac history. Denies any exacerbating or alleviating factors. Denies taking any medication for his discomfort. He has not contacted his PCP yet regarding this. Denies any fever, chills, recent illnesses, nausea, vomiting, diarrhea, abdominal pain, chest pain, shortness of breath, lightheadedness, dizziness, syncope, vision changes, extremity weakness from baseline MS neuropathy, or any other concerns at this time.     PAST MEDICAL / SURGICAL / SOCIAL / FAMILY HISTORY     PMH:  has a past medical history of Insomnia, Migraines, Movement disorder, Multiple sclerosis ([facility] [address] Neuropathy, and Sleep apnea. Surgical History:  has a past surgical history that includes Appendectomy () and Mouth surgery (2020). Social History:  reports that he has never smoked. He has never used smokeless tobacco. He reports current alcohol use of about 6.0 standard drinks of alcohol per week. He reports previous drug use. Family History: He indicated that the status of his mother is unknown. He indicated that his sister is . He indicated that the status of his maternal grandmother is unknown.   family history includes Cancer in his maternal grandmother; Heart Disease in his mother; Mult Sclerosis in his sister. Psychiatric History: None    Allergies: Bactrim [sulfamethoxazole-trimethoprim]    Home Medications:   Prior to Admission medications    Medication Sig Start Date End Date Taking? Authorizing Provider   oxybutynin (DITROPAN-XL) 10 MG extended release tablet Take 10 mg by mouth daily  21  Yes Historical Provider, MD   gabapentin (NEURONTIN) 300 MG capsule Take 300 mg by mouth 3 times daily. 21  Yes Historical Provider, MD   Cholecalciferol (VITAMIN D) 50 MCG ( UT) CAPS capsule Take 1 capsule by mouth daily   Yes Historical Provider, MD   buPROPion (WELLBUTRIN XL) 150 MG extended release tablet Take 150 mg by mouth every morning   Yes Historical Provider, MD   rOPINIRole (REQUIP) 2 MG tablet Take 2 mg by mouth daily   Yes Historical Provider, MD   amphetamine-dextroamphetamine (ADDERALL, 10MG,) 10 MG tablet Take 10 mg by mouth 2 times daily. Yes Historical Provider, MD   tiZANidine (ZANAFLEX) 4 MG tablet Take by mouth every 6 hours as needed    Yes Historical Provider, MD   zolpidem (AMBIEN) 10 MG tablet Take 10 mg by mouth nightly as needed for Sleep.    Yes Historical Provider, MD   ocrelizumab (OCREVUS) 300 MG/10ML SOLN injection INFUSE 600 mg  INTRAVENOUSLY EVERY 6 MONTHS 10/26/21 Angelia Peña MD       REVIEW OF SYSTEMS  (2-9 systems for level 4, 10 ormore for level 5)      Review of Systems    Constitutional: See HPI. Denies fever or chills. Eyes: Denies vision changes. HENT: Denies sore throat or neck pain. Respiratory: Denies cough or shortness of breath. Cardiovascular: Denies chest pain. GI: Denies vomiting or diarrhea. : Denies painful urination. Musculoskeletal: Denies recent trauma. Skin: Denies new rashes or wounds. Neurologic: See HPI. Psychiatric: Denies sleep disturbances. Heme: Denies bleeding disorders. All other systems negative except as marked. PHYSICAL EXAM  (up to 7 for level 4, 8 or more for level 5)      INITIAL VITALS:  height is 5' 11\" (1.803 m) and weight is 98.4 kg (217 lb). His oral temperature is 98.1 °F (36.7 °C). His blood pressure is 178/118 (abnormal) and his pulse is 69. His respiration is 22 and oxygen saturation is 97%. Vital signs reviewed. Physical Exam    General:  Alert, cooperative, well-groomed, well-nourished, appears stated age, and is in no acute distress. Head:  Normocephalic, atraumatic, and without obvious abnormality. Eyes:  Sclerae/conjunctivae clear without injection, pallor, or icterus. Corneas clear without opacities. EOM's intact. ENT: Ears and nose are all without obvious masses lesion or deformity. No oropharynx examination performed due to aerosolization risk during COVID-19 pandemic. Neck: Supple and symmetrical. Trachea midline. No adenopathy. No jugular venous distention. Lungs:   No respiratory distress. Clear to auscultation bilaterally. No wheezes, rhonchi, or rales. Heart:  Regular rate. Regular rhythm. No murmurs, rubs, or gallops. Abdomen:   Normoactive bowel sounds. Soft, nontender, nondistended without guarding or rebound. No palpable masses. No CVA tenderness. Extremities: Warm and dry without erythema or edema. Skin: Soft, good turgor, and well-hydrated.  No obvious rashes or lesions. Neurologic: GCS is 15 and no focal deficits are appreciated. Normal gait. Grossly normal motor and sensation. Speech clear. Psychiatric: Normal mood and affect. Normal behavior. Coherent thought process. DIFFERENTIAL DIAGNOSIS / MDM     Patient presents to the emergency department with the complaints as described above. Vital signs demonstrate hypertension at 184/109, but otherwise unremarkable. Physical exam demonstrates a well-appearing nontoxic male in no acute distress. His lungs are clear to auscultation. Heart rate and rhythm are regular. Abdomen is soft and nontender. No peripheral edema. Patient does not have a history of hypertension and has been noticing elevated blood pressures for a while now, but most consistently over the last 1 month. It appears to have started after he went to the [facility] on vacation. I'm speculating that the patient may be had excessive high salt foods/diet there and with the holidays and stress, this could all be contributing however, we will obtain a cardiac work-up and do a D-dimer due to his recent travel and chest imaging and reassess. He is currently not symptomatic at this time. Advised patient to try to relax and he was given some fluids.     PLAN (LABS / IMAGING / EKG):  Orders Placed This Encounter   Procedures    XR CHEST (2 VW)    CT CHEST PULMONARY EMBOLISM W CONTRAST    Basic Metabolic Panel    CBC Auto Differential    Troponin    D-Dimer, Quantitative    EKG 12 Lead    Insert peripheral IV       MEDICATIONS ORDERED:  Orders Placed This Encounter   Medications    iopamidol (ISOVUE-370) 76 % injection 75 mL    DISCONTD: sodium chloride flush 0.9 % injection 10 mL    0.9 % sodium chloride bolus       Controlled Substances Monitoring:     DIAGNOSTIC RESULTS     EKG: All EKG's are interpreted by the Emergency Department Physician who either signs or Co-signs this chart in the [address] a cardiologist.    See ED attending note    RADIOLOGY: All images are read by the radiologist and their interpretations are reviewed. XR CHEST (2 VW)    Result Date: 1/1/2022  EXAMINATION: TWO XRAY VIEWS OF THE CHEST 1/1/2022 4:12 pm COMPARISON: None. HISTORY: ORDERING SYSTEM PROVIDED HISTORY: palpitations, chest tign TECHNOLOGIST PROVIDED HISTORY: palpitations, chest tign Reason for Exam: palpitations Additional signs and symptoms: chest pain Relevant Medical/Surgical History: hx MS FINDINGS: The lungs are clear. Pulmonary vascularity is normal.  The cardiomediastinal silhouette is normal.     No acute abnormality. CT CHEST PULMONARY EMBOLISM W CONTRAST    Result Date: 1/1/2022  EXAMINATION: CTA OF THE CHEST 1/1/2022 12:41 pm TECHNIQUE: CTA of the chest was performed after the administration of intravenous contrast.  Multiplanar reformatted images are provided for review. MIP images are provided for review. Dose modulation, iterative reconstruction, and/or weight based adjustment of the mA/kV was utilized to reduce the radiation dose to as low as reasonably achievable. COMPARISON: Chest x-ray dated January 1, 2022 HISTORY: ORDERING SYSTEM PROVIDED HISTORY: elevated DDimer - recent travel TECHNOLOGIST PROVIDED HISTORY: elevated DDimer - recent travel Decision Support Exception - unselect if not a suspected or confirmed emergency medical condition->Emergency Medical Condition (MA) Reason for Exam: Elevated d-dimer, HTN FINDINGS: Pulmonary Arteries: Pulmonary arteries are adequately opacified for evaluation. No evidence of intraluminal filling defect to suggest pulmonary embolism. Main pulmonary artery is normal in caliber. Mediastinum: No evidence of mediastinal lymphadenopathy. The heart and pericardium demonstrate no acute abnormality. There is no acute abnormality of the thoracic aorta. Lungs/pleura: Dependent changes are present within the lung bases. .  No focal consolidation or pulmonary edema.   No evidence of pleural effusion or pneumothorax. Upper Abdomen: Images through the upper abdomen demonstrate a small hiatal hernia. Low-attenuation nodular thickening of the left adrenal gland is present consistent with adenomatous changes. Soft Tissues/Bones: No acute bone or soft tissue abnormality. Mild compression deformity is noted involving the lower thoracic vertebral body, degenerative changes present, consistent with chronic changes. No evidence of pulmonary embolism or acute pulmonary abnormality.  RECOMMENDATIONS: Unavailable       LABS:  Results for orders placed or performed during the hospital encounter of 55/00/92   Basic Metabolic Panel   Result Value Ref Range    Glucose 105 (H) 70 - 99 mg/dL    BUN 15 6 - 20 mg/dL    CREATININE 1.08 0.70 - 1.20 mg/dL    Bun/Cre Ratio NOT REPORTED 9 - 20    Calcium 9.1 8.6 - 10.4 mg/dL    Sodium 136 135 - 144 mmol/L    Potassium 4.1 3.7 - 5.3 mmol/L    Chloride 101 98 - 107 mmol/L    CO2 26 20 - 31 mmol/L    Anion Gap 9 9 - 17 mmol/L    GFR Non-African American >60 >60 mL/min    GFR African American >60 >60 mL/min    GFR Comment          GFR Staging NOT REPORTED    CBC Auto Differential   Result Value Ref Range    WBC 8.1 3.5 - 11.0 k/uL    RBC 4.87 4.5 - 5.9 m/uL    Hemoglobin 14.7 13.5 - 17.5 g/dL    Hematocrit 41.8 41 - 53 %    MCV 85.9 80 - 100 fL    MCH 30.2 26 - 34 pg    MCHC 35.2 31 - 37 g/dL    RDW 13.3 12.5 - 15.4 %    Platelets 200 892 - 193 k/uL    MPV 7.4 6.0 - 12.0 fL    NRBC Automated NOT REPORTED per 100 WBC    Differential Type NOT REPORTED     Seg Neutrophils 61 36 - 66 %    Lymphocytes 26 24 - 44 %    Monocytes 10 2 - 11 %    Eosinophils % 2 1 - 4 %    Basophils 1 0 - 2 %    Immature Granulocytes NOT REPORTED 0 %    Segs Absolute 5.10 1.8 - 7.7 k/uL    Absolute Lymph # 2.10 1.0 - 4.8 k/uL    Absolute Mono # 0.80 0.1 - 1.2 k/uL    Absolute Eos # 0.10 0.0 - 0.4 k/uL    Basophils Absolute 0.00 0.0 - 0.2 k/uL    Absolute Immature Granulocyte NOT REPORTED 0.00 - 0.30 k/uL    WBC Morphology NOT REPORTED     RBC Morphology NOT REPORTED     Platelet Estimate NOT REPORTED    Troponin   Result Value Ref Range    Troponin, High Sensitivity 7 0 - 22 ng/L    Troponin T NOT REPORTED <0.03 ng/mL    Troponin Interp NOT REPORTED    D-Dimer, Quantitative   Result Value Ref Range    D-Dimer, Quant 1.07 mg/L FEU   EKG 12 Lead   Result Value Ref Range    Ventricular Rate 91 BPM    Atrial Rate 91 BPM    P-R Interval 156 ms    QRS Duration 88 ms    Q-T Interval 372 ms    QTc Calculation (Bazett) 457 ms    P Axis 79 degrees    R Axis -18 degrees    T Axis 46 degrees       EMERGENCY DEPARTMENT COURSE           Vitals:    Vitals:    01/01/22 1510 01/01/22 1830 01/01/22 1836   BP: (!) 184/109 (!) 178/118 (!) 178/118   Pulse: 97 67 69   Resp: 14 16 22   Temp: 98.4 °F (36.9 °C) 98.1 °F (36.7 °C)    TempSrc: Oral Oral    SpO2: 99% 96% 97%   Weight: 98.4 kg (217 lb)     Height: 5' 11\" (1.803 m)       -------------------------  BP:  (ok to dc with bp per Mattie LINDA), Temp: 98.1 °F (36.7 °C), Pulse: 69, Resp: 22      RE-EVALUATION:  Patient's lab work is unremarkable except D-dimer which was slightly elevated so I did obtain a CT scan of the chest which did not demonstrate any PE which is reassuring due to the recent travel. Chest x-ray is normal.  Patient and I had a lengthy discussion regarding hypertension and different causes for it. Although his blood pressure currently elevated today, it was 154/91 2 days ago at his infusion for his MS. He may require hypertensive medication at future however I will not be starting this here in the ER. He will contact his primary care provider and follow-up outpatient for this. In the meantime, we did discuss the importance of a low-salt diet, hydration, and stress-free environments. Patient verbalized understanding feels comfortable at home with.      The patient and/or family and I have discussed the diagnosis and risks, and we agree with discharging home to follow-up with their pertinent providers. The patient appears stable for discharge and has been instructed to return immediately for new concerning symptoms or if the symptoms worsen in any way. The patient understands that at this time there is no evidence for a more malignant underlying process, but the patient also understands that early in the process of an illness or injury, an emergency department workup can be falsely reassuring. Routine discharge counseling was given, and the patient understands that worsening, changing or persistent symptoms should prompt an immediate call or follow up with their primary physician or return to the emergency department. I have reviewed the disposition diagnosis with the patient and or their family/guardian. I have answered their questions and given discharge instructions. They voiced understanding of these instructions and did not have any further questions or complaints. This patient was seen by the attending physician and they agreed with the assessment and plan. CONSULTS:  None    PROCEDURES:  None    FINAL IMPRESSION      1. Hypertension, unspecified type    2. Palpitations          DISPOSITION / PLAN     CONDITION ON DISPOSITION:   Good / Stable for discharge.      PATIENT REFERRED TO:  Stephane Oro MD  [address] [place] [address]  [phone]    Call in 2 days        DISCHARGE MEDICATIONS:  Discharge Medication List as of 1/1/2022  6:43 PM          [facility]   Emergency Medicine Physician Assistant    (Please note that portions of this note were completed with a voice recognition program.  Efforts were made to edit the dictations but occasionally words aremis-transcribed.)       Irma Azul PA-C  01/05/22 0125

## 2022-01-03 LAB
EKG ATRIAL RATE: 91 BPM
EKG P AXIS: 79 DEGREES
EKG P-R INTERVAL: 156 MS
EKG Q-T INTERVAL: 372 MS
EKG QRS DURATION: 88 MS
EKG QTC CALCULATION (BAZETT): 457 MS
EKG R AXIS: -18 DEGREES
EKG T AXIS: 46 DEGREES
EKG VENTRICULAR RATE: 91 BPM

## 2022-03-15 ENCOUNTER — OFFICE VISIT (OUTPATIENT)
Dept: NEUROLOGY | Age: 57
End: 2022-03-15
Payer: COMMERCIAL

## 2022-03-15 VITALS
BODY MASS INDEX: 33.04 KG/M2 | WEIGHT: 236 LBS | HEIGHT: 71 IN | HEART RATE: 100 BPM | SYSTOLIC BLOOD PRESSURE: 164 MMHG | DIASTOLIC BLOOD PRESSURE: 106 MMHG

## 2022-03-15 DIAGNOSIS — R41.3 MEMORY DIFFICULTIES: ICD-10-CM

## 2022-03-15 DIAGNOSIS — R20.0 NUMBNESS AND TINGLING: ICD-10-CM

## 2022-03-15 DIAGNOSIS — R20.2 NUMBNESS AND TINGLING: ICD-10-CM

## 2022-03-15 DIAGNOSIS — Z79.899 HIGH RISK MEDICATION USE: ICD-10-CM

## 2022-03-15 DIAGNOSIS — D80.9 IMMUNOGLOBULIN DEFICIENCY (HCC): ICD-10-CM

## 2022-03-15 DIAGNOSIS — G35 RELAPSING REMITTING MULTIPLE SCLEROSIS (HCC): Primary | ICD-10-CM

## 2022-03-15 PROCEDURE — 99214 OFFICE O/P EST MOD 30 MIN: CPT | Performed by: PSYCHIATRY & NEUROLOGY

## 2022-03-15 PROCEDURE — 3017F COLORECTAL CA SCREEN DOC REV: CPT | Performed by: PSYCHIATRY & NEUROLOGY

## 2022-03-15 PROCEDURE — 1036F TOBACCO NON-USER: CPT | Performed by: PSYCHIATRY & NEUROLOGY

## 2022-03-15 PROCEDURE — G8417 CALC BMI ABV UP PARAM F/U: HCPCS | Performed by: PSYCHIATRY & NEUROLOGY

## 2022-03-15 PROCEDURE — G8484 FLU IMMUNIZE NO ADMIN: HCPCS | Performed by: PSYCHIATRY & NEUROLOGY

## 2022-03-15 PROCEDURE — G8427 DOCREV CUR MEDS BY ELIG CLIN: HCPCS | Performed by: PSYCHIATRY & NEUROLOGY

## 2022-03-15 RX ORDER — MULTIVIT-MIN/IRON/FOLIC ACID/K 18-600-40
1 CAPSULE ORAL DAILY
Qty: 30 CAPSULE | Refills: 6 | Status: SHIPPED | OUTPATIENT
Start: 2022-03-15

## 2022-03-15 RX ORDER — VALSARTAN 40 MG/1
40 TABLET ORAL DAILY
COMMUNITY
Start: 2022-03-07

## 2022-03-15 NOTE — PROGRESS NOTES
NEUROLOGY FOLLOW-UP VISIT   Patient Name:       Rohit Fernandez  :        1965  Clinic Visit Date:    3/15/2022  LOV: 21      Dear Dr. Lucero Gomez MD     I saw Mr. Rohit Fernandez in follow-up in the office today in continuation of neurologic care. As you know he  is a 55 y.o. right handed  retired  male with  Relapsing remitting multiple sclerosis since ; on disease modifying therapy with IV ocrelizumab. After last visit in 2021; he was evaluated at [facility] clinic and they had done MRI brain and it was reportedly without any new lesions. He also had low immunoglobulin levels for which evaluated by the immunologist at [facility] and he was advised additional vaccines including pneumococcal vaccine, flu vaccine and shingles vaccine. He has upcoming blood work to follow-up on low immunoglobulin levels. He has had Covid 19 vaccine x2 along with booster. He has done well with those. Had IV ocrevus infusion towards end of 2021. He developed COVID-19 infection in mid January with cough, fever and fatigue symptomatology but didn't require any hospitalization. He self quarantined and done well. Review of systems done by staff reviewed and pertinent positives include fatigue, lightheadedness and restless legs symptomatology. He admits to having memory difficulties but has been independent of all ADLs. Has been driving without MVAs. Also has occasional headaches with neck stiffness and tizanidine has been helpful for muscle spasms. Of note; he transitioned his ocrevus infusions from [facility] to [facility] and prefers to continue treatments within [facility]. He did see hematology/ immunology due to low IgM <7, immunology OKd continuing ocrevus. He was advised with multiple vaccines as stated above. Also has had follow-up brain & Cervical Spine MRIs at [facility]. Admits to having ongoing short-term memory difficulties. He is completely independent of all ADLs. He has been driving without any problems. But he is concerned about short-term memory difficulties; he has upcoming appointment with neuropsych in August 2022. He stated that he was diagnosed with multiple sclerosis in 2010 with eye problems in the past.  He has had MRI brain and spine studies and was diagnosed with multiple sclerosis. Never had spinal fluid studies. He was on Rebif earlier but he stopped it due to headaches. He was also on Copaxone and stopped it in March 2011 due to relapses. Later onwards he was on Tysabri for few years. Then it was switched to IV ocrelizumab because of positive JCV. He has been tolerating IV eculizumab well without any adverse effects such as skin or respiratory infections, infusion related reactions. Has not had any neutropenias. He has been doing well without any exacerbations of multiple sclerosis and tolerated ocrelizumab well without any infusion reactions. He does feel right lower extremity weakness and also has memory complaints along with numbness, weakness and gait difficulties as stated. He has significantly unprovoked fatigue. Conservative measures did not help. He has been on Adderall and he has been able to tolerate fatigue well. He admits to having memory difficulties, predominantly short-term memory but he is completely independent of all ADLs. He is still driving without any problems. Also stated that he used to work as a full time  but stopped working because of [employer] [employer]. Denies anxiety and depression. Denies urinary dysfunction. He has had numbness and tingling in right lower extremity and left groin for which he has had extensive work-up at [facility] in the past and those were unrevealing. Tried gabapentin and amitriptyline with no relief. Presently on zonisamide and tizanidine for painful sensations and spasms. Denies numbness but admits to right lower extremity weakness as stated above. Denies blurred vision and diplopia. He is a retired . DISEASE SUMMARY  Date of onset: 2010  Date of diagnosis of MS: 2011  Disease course at onset: Relapsing-Remitting  Current disease course: Relapsing-Remitting  Previous disease therapies: Relief (took for few months; stopped due to headaches), Copaxone (stopped March 2011 due to relapse), Tysabri (2011-March 2018, JCV plus)  Current disease therapy: IV ocrelizumab (04/2018-present), last infusion Dec 2021  CSF: None  JCV serology result and date: (2018) + 0.33  Vitamin D: 68.1 (06/2020)  Smoking status: Never  EDSS: 1.5  9HPT: 37.4  T25W: 6.27     Current Outpatient Medications on File Prior to Visit   Medication Sig Dispense Refill    valsartan (DIOVAN) 40 MG tablet Take 40 mg by mouth 2 times daily       ocrelizumab (OCREVUS) 300 MG/10ML SOLN injection INFUSE 600 mg  INTRAVENOUSLY EVERY 6 MONTHS 20 mL 1    oxybutynin (DITROPAN-XL) 10 MG extended release tablet Take 10 mg by mouth daily       gabapentin (NEURONTIN) 300 MG capsule Take 300 mg by mouth 3 times daily.  Cholecalciferol (VITAMIN D) 50 MCG (2000 UT) CAPS capsule Take 1 capsule by mouth daily      buPROPion (WELLBUTRIN XL) 150 MG extended release tablet Take 150 mg by mouth every morning      rOPINIRole (REQUIP) 2 MG tablet Take 2 mg by mouth daily      amphetamine-dextroamphetamine (ADDERALL, 10MG,) 10 MG tablet Take 10 mg by mouth 2 times daily.  tiZANidine (ZANAFLEX) 4 MG tablet Take by mouth every 6 hours as needed       zolpidem (AMBIEN) 10 MG tablet Take 10 mg by mouth nightly as needed for Sleep. No current facility-administered medications on file prior to visit. Allergies: Servando Maldonado is allergic to bactrim [sulfamethoxazole-trimethoprim].     Past Medical History:   Diagnosis Date    Insomnia     Migraines     Movement disorder     Multiple sclerosis (HCC)     Neuropathy     hands, feet    Sleep apnea        Past Surgical History:   Procedure Laterality Date    APPENDECTOMY 5 Where are we: State, county, town, hospital, floor? Maximum score 3 Score 3 Name 3 objects: ball, pen, car. Ask patient to repeat 3 objects. Maximum score 5 Score 5 Serial sevens from 100:93, 86, 79, 72, 65   Maximum score 3 Score 3 Recall 3 objects   Maximum score 2 Score 2 Name a pencil and watch. Maximum score 1 Score 1 Repeat the following: No ifs ands or buts.      Maximum score 3 Score 3 Follow 3 stage command take a paper in your hand, fold it in half, and put it on the floor. Maximum score 1  Score 1 Read and obey the following: Close your eyes     Maximum score 1 Score 1 Write a sentence. Maximum score 1 Score 1 Copy a design below. Max 30   Patients score 30                Diagnostic data reviewed with the patient:   MRI brain (with/without) 9/17/2020: Scattered foci of hyperintensity involving periventricular and subcortical deep white matter with involvement of callosal septal junction. Findings consistent with a demyelinating process such as multiple sclerosis. No restricted diffusion or contrast enhancement to suggest active flareup. MRI cervical spine (with/without) 8/14/2019: Unremarkable pre and postcontrast MRI evaluation of spinal cord. Mild multilevel degenerative disc disease with uncovertebral and facet hypertrophy with mild left foraminal narrowing at C3-4, mild right foraminal narrowing at C4-5 and mild right foraminal narrowing at C5-6. MRI thoracic spine (with/without) 8/14/2019: Unremarkable pre and postcontrast MRI of the thoracic spine and thoracic spinal cord. Remote T1 compression deformity.     CBC:     Lab Results   Component Value Date    WBC 8.1 01/01/2022     01/01/2022      BMP:     Lab Results   Component Value Date     01/01/2022    K 4.1 01/01/2022    GLUCOSE 105 (H) 01/01/2022    CALCIUM 9.1 01/01/2022         Lab Results   Component Value Date    ALT 30 06/09/2020    AST 25 06/09/2020    TSH 2.17 08/27/2021    Carline Learn 409 08/27/2021     Lab Results   Component Value Date    VITD25 68.1 06/09/2020     EEG (11/11/21): NORMAL    Ig G done through [facility] (5/17/2021): 582 (700-1600)  Ig M done through [facility] (5/17/2021): 7 ( )           MRI Brain (w/wo) at [facility] (12/13/2021):  Multiple intracranial white matter lesions compatible with multiple   sclerosis.   No new T2 lesions and no new enhancing lesions.  No  significant parenchymal volume loss. Other Significant Intracranial Findings:  None     MRI Cervical Spine (w/wo) at [facility] (12/13/2021):  Scattered intramedullary lesions compatible with the clinical history of  multiple sclerosis.  No new T2 intramedullary lesions and no new  enhancing intramedullary lesions.  No significant volume loss of the  upper spinal cord for age. Other Significant Cervical Spine Findings:  No significant cervical canal  or foraminal stenosis. Impression and Plan: Mr. Servando Maldonado is a 55 y.o. male with   Relapsing remitting multiple sclerosis since 2010; on disease modifying therapy with IV ocrelizumab., Last infusion on 12/30/21; Multiple sclerosis has been stable without any exacerbations. Getting infusions through [facility]. Recent surveillance brain & spine MRIs at [facility] stable as above. Ocrevus monitoring/low immunoglobulin levels: following up with immunologist/MS specialist team at [facility]; on schedule for rpt immunoglobin A, G, M levels at [facility]. For his ongoing subjective memory difficulties (scored 30/30 on MMSE); B12, folate, TSH unremarkable. .  EEG normal. To continue conservative measures. Neuropsych eval pending; on schedule at [facility] for August 18th this year. Comorbid mood disorder with depressive features: On bupropion & amitriptyline as per primary team/ [facility]      Unprovoked fatigue: Has been on Adderall 10 mg twice daily. Bladder dysfunction: Stable on oxybutynin.   Dysesthesias and muscle spasms: Off of zonisamide; to continue tizanidine 4 mg q6hr prn  Vit D def: Continue vitamin D supplements. Follow-up in 6 months. This note was partially created using voice recognition software and is inherently subject to errors including those of syntax and \"sound alike\" substitutions which may escape proofreading. In such instances, original meaning may be extrapolated by contextual derivation.

## 2022-03-15 NOTE — PROGRESS NOTES
Constitutional [] Weight loss/gain   [x] Fatigue  [] Fever/Chills   HEENT [] Hearing Loss  [] Visual Disturbance  [] Tinnitus  [] Eye pain   Respiratory [] Shortness of Breath  [] Cough  [] Snoring   Cardiovascular [] Chest Pain  [] Palpitations  [x] Lightheaded   GI [] Constipation  [] Diarrhea  [] Swallowing change  [] Nausea/vomiting    [] Urinary Frequency  [] Urinary Urgency   Musculoskeletal [] Neck pain  [] Back pain  [] Muscle pain  [x] Restless legs   Dermatologic [] Skin changes   Neurologic [x] Memory loss/confusion  [] Seizures  [x] Trouble walking or imbalance  [x] Dizziness  [] Sleep disturbance  [x] Weakness  [x] Numbness  [] Tremors  [] Speech Difficulty  [x] Headaches  [] Light Sensitivity  [] Sound Sensitivity   Endocrinology []Excessive thirst  []Excessive hunger   Psychiatric [] Anxiety/Depression  [] Hallucination   Allergy/immunology []Hives/environmental allergies   Hematologic/lymph [] Abnormal bleeding  [] Abnormal bruising

## 2022-05-17 ENCOUNTER — HOSPITAL ENCOUNTER (OUTPATIENT)
Age: 57
Discharge: HOME | End: 2022-05-17
Payer: COMMERCIAL

## 2022-05-17 LAB
ABSOLUTE EOS #: 0.08 K/UL (ref 0–0.44)
ABSOLUTE IMMATURE GRANULOCYTE: <0.03 K/UL (ref 0–0.3)
ABSOLUTE LYMPH #: 1.04 K/UL (ref 1.1–3.7)
ABSOLUTE MONO #: 0.66 K/UL (ref 0.1–1.2)
ALBUMIN SERPL-MCNC: 4.6 G/DL (ref 3.5–5.2)
ALBUMIN/GLOBULIN RATIO: 2.9 (ref 1–2.5)
ALP BLD-CCNC: 87 U/L (ref 40–129)
ALT SERPL-CCNC: 19 U/L (ref 5–41)
ANION GAP SERPL CALCULATED.3IONS-SCNC: 12 MMOL/L (ref 9–17)
ANION GAP SERPL CALCULATED.3IONS-SCNC: 13 MMOL/L (ref 9–17)
AST SERPL-CCNC: 25 U/L
BASOPHILS # BLD: 1 % (ref 0–2)
BASOPHILS ABSOLUTE: 0.04 K/UL (ref 0–0.2)
BILIRUB SERPL-MCNC: 0.47 MG/DL (ref 0.3–1.2)
BILIRUBIN URINE: NEGATIVE
BUN BLDV-MCNC: 13 MG/DL (ref 6–20)
BUN BLDV-MCNC: 14 MG/DL (ref 6–20)
CALCIUM SERPL-MCNC: 9.3 MG/DL (ref 8.6–10.4)
CALCIUM SERPL-MCNC: 9.5 MG/DL (ref 8.6–10.4)
CHLORIDE BLD-SCNC: 102 MMOL/L (ref 98–107)
CHLORIDE BLD-SCNC: 97 MMOL/L (ref 98–107)
CHOLESTEROL, FASTING: 170 MG/DL
CHOLESTEROL/HDL RATIO: 3.1
CO2: 22 MMOL/L (ref 20–31)
CO2: 24 MMOL/L (ref 20–31)
COLOR: YELLOW
COMMENT UA: NORMAL
CORTISOL COLLECTION INFO: NORMAL
CORTISOL: 9.3 UG/DL (ref 2.7–18.4)
CREAT SERPL-MCNC: 0.76 MG/DL (ref 0.7–1.2)
CREAT SERPL-MCNC: 0.81 MG/DL (ref 0.7–1.2)
CREATININE URINE: 71.4 MG/DL (ref 39–259)
EOSINOPHILS RELATIVE PERCENT: 2 % (ref 1–4)
GFR AFRICAN AMERICAN: >60 ML/MIN
GFR AFRICAN AMERICAN: >60 ML/MIN
GFR NON-AFRICAN AMERICAN: >60 ML/MIN
GFR NON-AFRICAN AMERICAN: >60 ML/MIN
GFR SERPL CREATININE-BSD FRML MDRD: ABNORMAL ML/MIN/{1.73_M2}
GFR SERPL CREATININE-BSD FRML MDRD: ABNORMAL ML/MIN/{1.73_M2}
GLUCOSE BLD-MCNC: 105 MG/DL (ref 70–99)
GLUCOSE FASTING: 99 MG/DL (ref 70–99)
GLUCOSE URINE: NEGATIVE
HCT VFR BLD CALC: 40.9 % (ref 40.7–50.3)
HDLC SERPL-MCNC: 55 MG/DL
HEMOGLOBIN: 14.3 G/DL (ref 13–17)
IMMATURE GRANULOCYTES: 0 %
KETONES, URINE: NEGATIVE
LDL CHOLESTEROL: 108 MG/DL (ref 0–130)
LEUKOCYTE ESTERASE, URINE: NEGATIVE
LYMPHOCYTES # BLD: 22 % (ref 24–43)
MAGNESIUM: 2 MG/DL (ref 1.6–2.6)
MCH RBC QN AUTO: 30.5 PG (ref 25.2–33.5)
MCHC RBC AUTO-ENTMCNC: 35 G/DL (ref 28.4–34.8)
MCV RBC AUTO: 87.2 FL (ref 82.6–102.9)
MICROALBUMIN/CREAT 24H UR: <12 MG/L
MICROALBUMIN/CREAT UR-RTO: NORMAL MCG/MG CREAT
MONOCYTES # BLD: 14 % (ref 3–12)
NITRITE, URINE: NEGATIVE
NRBC AUTOMATED: 0 PER 100 WBC
PDW BLD-RTO: 11.8 % (ref 11.8–14.4)
PH UA: 7 (ref 5–8)
PLATELET # BLD: 241 K/UL (ref 138–453)
PMV BLD AUTO: 9.9 FL (ref 8.1–13.5)
POTASSIUM SERPL-SCNC: 4.7 MMOL/L (ref 3.7–5.3)
POTASSIUM SERPL-SCNC: 4.8 MMOL/L (ref 3.7–5.3)
PRO-BNP: 47 PG/ML
PROTEIN UA: NEGATIVE
RBC # BLD: 4.69 M/UL (ref 4.21–5.77)
SEG NEUTROPHILS: 61 % (ref 36–65)
SEGMENTED NEUTROPHILS ABSOLUTE COUNT: 2.91 K/UL (ref 1.5–8.1)
SODIUM BLD-SCNC: 133 MMOL/L (ref 135–144)
SODIUM BLD-SCNC: 137 MMOL/L (ref 135–144)
SODIUM,UR: 80 MMOL/L
SPECIFIC GRAVITY UA: 1.01 (ref 1–1.03)
TOTAL PROTEIN: 6.2 G/DL (ref 6.4–8.3)
TRIGLYCERIDE, FASTING: 34 MG/DL
TURBIDITY: CLEAR
URINE HGB: NEGATIVE
UROBILINOGEN, URINE: NORMAL
WBC # BLD: 4.7 K/UL (ref 3.5–11.3)

## 2022-05-17 PROCEDURE — 82533 TOTAL CORTISOL: CPT

## 2022-05-17 PROCEDURE — 86317 IMMUNOASSAY INFECTIOUS AGENT: CPT

## 2022-05-17 PROCEDURE — 84244 ASSAY OF RENIN: CPT

## 2022-05-17 PROCEDURE — 82024 ASSAY OF ACTH: CPT

## 2022-05-17 PROCEDURE — 83880 ASSAY OF NATRIURETIC PEPTIDE: CPT

## 2022-05-17 PROCEDURE — 80061 LIPID PANEL: CPT

## 2022-05-17 PROCEDURE — 82043 UR ALBUMIN QUANTITATIVE: CPT

## 2022-05-17 PROCEDURE — 80053 COMPREHEN METABOLIC PANEL: CPT

## 2022-05-17 PROCEDURE — 36415 COLL VENOUS BLD VENIPUNCTURE: CPT

## 2022-05-17 PROCEDURE — 83735 ASSAY OF MAGNESIUM: CPT

## 2022-05-17 PROCEDURE — 80048 BASIC METABOLIC PNL TOTAL CA: CPT

## 2022-05-17 PROCEDURE — 85025 COMPLETE CBC W/AUTO DIFF WBC: CPT

## 2022-05-17 PROCEDURE — 81003 URINALYSIS AUTO W/O SCOPE: CPT

## 2022-05-17 PROCEDURE — 84300 ASSAY OF URINE SODIUM: CPT

## 2022-05-17 PROCEDURE — 82088 ASSAY OF ALDOSTERONE: CPT

## 2022-05-17 PROCEDURE — 82570 ASSAY OF URINE CREATININE: CPT

## 2022-05-18 LAB — ADRENOCORTICOTROPIC HORMONE: 17 PG/ML (ref 7–69)

## 2022-05-20 LAB
ALDOSTERONE: 4.6 NG/DL
PNEUMOCOCCAL ANTIBODY TYPE 12: 1.06 UG/ML
PNEUMOCOCCAL ANTIBODY TYPE 14: 0.28 UG/ML
PNEUMOCOCCAL ANTIBODY TYPE 18: 1.63 UG/ML
PNEUMOCOCCAL ANTIBODY TYPE 19F: 0.81 UG/ML
PNEUMOCOCCAL ANTIBODY TYPE 1: 0.21 UG/ML
PNEUMOCOCCAL ANTIBODY TYPE 23: 0.57 UG/ML
PNEUMOCOCCAL ANTIBODY TYPE 3: 1.39 UG/ML
PNEUMOCOCCAL ANTIBODY TYPE 4: 0.09 UG/ML
PNEUMOCOCCAL ANTIBODY TYPE 5: 0.11 UG/ML
PNEUMOCOCCAL ANTIBODY TYPE 6B: 0.22 UG/ML
PNEUMOCOCCAL ANTIBODY TYPE 7F: 0.46 UG/ML
PNEUMOCOCCAL ANTIBODY TYPE 8: 0.66 UG/ML
PNEUMOCOCCAL ANTIBODY TYPE 9N: 0.19 UG/ML
PNEUMOCOCCAL ANTIBODY TYPE 9V: 0.36 UG/ML
PNEUMOCOCCAL INTERPRETATION: NORMAL
RENIN ACTIVITY: 1.7 NG/ML/HR
S. PNEUM TYPE 19A,IGG: 0 UG/ML
S. PNEUM TYPE 2,IGG: 0.76 UG/ML
S. PNEUM TYPE 20,IGG: 0.99 UG/ML
S. PNEUM TYPE 22F,IGG: 1.26 UG/ML
S. PNEUM TYPE 33F,IGG: 0.22 UG/ML
STREP PNEUMO TYPE 10A: 0.06 UG/ML
STREP PNEUMO TYPE 11A: 0.32 UG/ML
STREP PNEUMO TYPE 15B: 0.06 UG/ML
STREP PNEUMO TYPE 17F: 0.94 UG/ML

## 2022-09-15 ENCOUNTER — OFFICE VISIT (OUTPATIENT)
Dept: NEUROLOGY | Age: 57
End: 2022-09-15
Payer: COMMERCIAL

## 2022-09-15 VITALS
SYSTOLIC BLOOD PRESSURE: 134 MMHG | HEIGHT: 71 IN | WEIGHT: 223 LBS | HEART RATE: 75 BPM | BODY MASS INDEX: 31.22 KG/M2 | DIASTOLIC BLOOD PRESSURE: 89 MMHG

## 2022-09-15 DIAGNOSIS — M54.16 LUMBAR RADICULOPATHY: Primary | ICD-10-CM

## 2022-09-15 DIAGNOSIS — M62.838 MUSCLE SPASMS OF BOTH LOWER EXTREMITIES: ICD-10-CM

## 2022-09-15 DIAGNOSIS — G35 RELAPSING REMITTING MULTIPLE SCLEROSIS (HCC): ICD-10-CM

## 2022-09-15 DIAGNOSIS — R53.83 OTHER FATIGUE: ICD-10-CM

## 2022-09-15 PROCEDURE — 99214 OFFICE O/P EST MOD 30 MIN: CPT | Performed by: NURSE PRACTITIONER

## 2022-09-15 PROCEDURE — 1036F TOBACCO NON-USER: CPT | Performed by: NURSE PRACTITIONER

## 2022-09-15 PROCEDURE — G8417 CALC BMI ABV UP PARAM F/U: HCPCS | Performed by: NURSE PRACTITIONER

## 2022-09-15 PROCEDURE — 3017F COLORECTAL CA SCREEN DOC REV: CPT | Performed by: NURSE PRACTITIONER

## 2022-09-15 PROCEDURE — G8427 DOCREV CUR MEDS BY ELIG CLIN: HCPCS | Performed by: NURSE PRACTITIONER

## 2022-09-15 RX ORDER — SPIRONOLACTONE AND HYDROCHLOROTHIAZIDE 25; 25 MG/1; MG/1
1 TABLET ORAL
COMMUNITY
Start: 2022-04-16

## 2022-09-15 NOTE — PROGRESS NOTES
[facility]            [address]          [facility], [address] [facility]          Dept: [phone]          Dept Fax: [phone]    MD Nel Pugh MD Omer Proffer, MD Gildardo Forehand, CNP            9/15/2022      HISTORY OF PRESENT ILLNESS:       I had the pleasure of seeing Roberto Fleischer, who returns for continuing neurologic care. The patient was seen last on March 15, 2022 for treatment of relapsing remitting multiple sclerosis and memory difficulties. The patient has relapsing remitting multiple sclerosis and was prescribed IV ocrevus for disease modifying therapy. The patient's last MRIs of his brain and cervical spine were completed in December 2021 which showed no evidence for acute or active demyelination. The patient follows with SREEKANTH Galeano at the [facility] for his multiple sclerosis. He has continued to follow with the [facility] and notes he has an upcoming appointment scheduled in December 2022 with pre-visit surveillance MRIs. He has significant fatigue associated with his multiple sclerosis. He also has significant low back pain and was previously referred to physical therapy however he has not yet attended. The pain in his back begins in his lower back with radiation into his left hip, left butt and left lower extremity. He was prescribed gabapentin 300 mg daily however he notes that it does not provided him with significant relief. When he is lying down his back pain is worsened.      DISEASE SUMMARY  Date of onset: 2010  Date of diagnosis of MS: 2011  Disease course at onset: Relapsing-Remitting  Current disease course: Relapsing-Remitting  Previous disease therapies: Relief (took for few months; stopped due to headaches), Copaxone (stopped March 2011 due to relapse), Tysabri (2011-March 2018, JCV plus)  Current disease therapy: IV ocrelizumab (04/2018-present), last infusion Dec 2021  CSF: None  JCV serology result and date: (2018) + 0.33  Vitamin D: 68.1 (06/2020)  Smoking status: Never  EDSS: 1.5  9HPT: 37.4  T25W: 6.27    He also has memory difficulties with a previous MMSE score of 30/30. He was previously referred to neuropsych testing however he cancelled the appointment. Testing reviewed:    MRI Brain (w/wo) at [facility] (12/13/2021):  Multiple intracranial white matter lesions compatible with multiple   sclerosis. No new T2 lesions and no new enhancing lesions. No  significant parenchymal volume loss. Other Significant Intracranial Findings:  None      MRI Cervical Spine (w/wo) at [facility] (12/13/2021):  Scattered intramedullary lesions compatible with the clinical history of  multiple sclerosis. No new T2 intramedullary lesions and no new  enhancing intramedullary lesions. No significant volume loss of the  upper spinal cord for age. Other Significant Cervical Spine Findings:  No significant cervical canal  or foraminal stenosis. Ig G done through [facility] (5/17/2021): 582 (700-1600)  Ig M done through [facility] (5/17/2021): 7 ( )          MRI brain (with/without) 9/17/2020: Scattered foci of hyperintensity involving periventricular and subcortical deep white matter with involvement of callosal septal junction. Findings consistent with a demyelinating process such as multiple sclerosis. No restricted diffusion or contrast enhancement to suggest active flareup. MRI cervical spine (with/without) 8/14/2019: Unremarkable pre and postcontrast MRI evaluation of spinal cord. Mild multilevel degenerative disc disease with uncovertebral and facet hypertrophy with mild left foraminal narrowing at C3-4, mild right foraminal narrowing at C4-5 and mild right foraminal narrowing at C5-6. MRI thoracic spine (with/without) 8/14/2019: Unremarkable pre and postcontrast MRI of the thoracic spine and thoracic spinal cord. Remote T1 compression deformity.     PAST MEDICAL HISTORY: Diagnosis Date    Insomnia     Migraines     Movement disorder     Multiple sclerosis (HCC)     Neuropathy     hands, feet    Sleep apnea         PAST SURGICAL HISTORY:         Procedure Laterality Date    APPENDECTOMY  1987    MOUTH SURGERY  06/2020        SOCIAL HISTORY:     Social History     Socioeconomic History    Marital status:      Spouse name: Not on file    Number of children: Not on file    Years of education: Not on file    Highest education level: Not on file   Occupational History    Not on file   Tobacco Use    Smoking status: Never    Smokeless tobacco: Never   Vaping Use    Vaping Use: Never used   Substance and Sexual Activity    Alcohol use: Yes     Alcohol/week: 4.0 standard drinks     Types: 2 Glasses of wine, 2 Shots of liquor per week     Comment: socially    Drug use: Not Currently    Sexual activity: Not on file   Other Topics Concern    Not on file   Social History Narrative    Not on file     Social Determinants of Health     Financial Resource Strain: Not on file   Food Insecurity: Not on file   Transportation Needs: Not on file   Physical Activity: Not on file   Stress: Not on file   Social Connections: Not on file   Intimate Partner Violence: Not on file   Housing Stability: Not on file       CURRENT MEDICATIONS:     Current Outpatient Medications   Medication Sig Dispense Refill    spironolactone-hydroCHLOROthiazide (ALDACTAZIDE) 25-25 MG per tablet Take 1 tablet by mouth three times a week      valsartan (DIOVAN) 40 MG tablet Take 40 mg by mouth daily      Cholecalciferol (VITAMIN D) 50 MCG (2000 UT) CAPS capsule Take 1 capsule by mouth daily 30 capsule 6    ocrelizumab (OCREVUS) 300 MG/10ML SOLN injection INFUSE 600 mg  INTRAVENOUSLY EVERY 6 MONTHS 20 mL 1    oxybutynin (DITROPAN-XL) 10 MG extended release tablet Take 10 mg by mouth daily       gabapentin (NEURONTIN) 300 MG capsule Take 300 mg by mouth daily.       buPROPion (WELLBUTRIN XL) 150 MG extended release tablet Take 150 mg by mouth every morning      rOPINIRole (REQUIP) 2 MG tablet Take 2 mg by mouth daily      amphetamine-dextroamphetamine (ADDERALL) 10 MG tablet Take 10 mg by mouth 2 times daily. tiZANidine (ZANAFLEX) 4 MG tablet Take by mouth every 6 hours as needed       zolpidem (AMBIEN) 10 MG tablet Take 10 mg by mouth nightly as needed for Sleep. No current facility-administered medications for this visit. ALLERGIES:     Allergies   Allergen Reactions    Bactrim [Sulfamethoxazole-Trimethoprim] Anaphylaxis and Hives     swallowing                                 REVIEW OF SYSTEMS        All items selected indicate a positive finding. Those items not selected are negative.   Constitutional [] Weight loss/gain   [] Fatigue  [] Fever/Chills   HEENT [] Hearing Loss  [] Visual Disturbance  [] Tinnitus  [] Eye pain   Respiratory [] Shortness of Breath  [] Cough  [] Snoring   Cardiovascular [] Chest Pain  [] Palpitations  [] Lightheaded   GI [] Constipation  [] Diarrhea  [] Swallowing change  [] Nausea/vomiting    [] Urinary Frequency  [] Urinary Urgency   Musculoskeletal [] Neck pain  [x] Back pain  [] Muscle pain  [] Restless legs   Dermatologic [] Skin changes   Neurologic [x] Memory loss/confusion  [] Seizures  [] Trouble walking or imbalance  [] Dizziness  [] Sleep disturbance  [] Weakness  [] Numbness  [] Tremors  [] Speech Difficulty  [] Headaches  [] Light Sensitivity  [] Sound Sensitivity   Endocrinology []Excessive thirst  []Excessive hunger   Psychiatric [] Anxiety/Depression  [] Hallucination   Allergy/immunology []Hives/environmental allergies   Hematologic/lymph [] Abnormal bleeding  [] Abnormal bruising         PHYSICAL EXAMINATION:       Vitals:    09/15/22 1026   BP: 134/89   Pulse: 75                                              .                                                                                                    General Appearance:  Alert, cooperative, no signs of distress, appears stated age   Head:  Normocephalic, no signs of trauma   Eyes:  Conjunctiva/corneas clear;  eyelids intact   Ears:  Normal external ear and canals   Nose: Nares normal, mucosa normal, no drainage    Throat: Lips and tongue normal; teeth normal;  gums normal   Neck: Supple, intact flexion, extension and rotation;   trachea midline;  no adenopathy;   thyroid: not enlarged;   no carotid pulse abnormality   Back:   Symmetric, no curvature, ROM adequate   Lungs:   Respirations unlabored   Heart:  Regular rate and rhythm           Extremities: Extremities normal, no cyanosis, no edema   Pulses: Symmetric over head and neck   Skin: Skin color, texture normal, no rashes, no lesions                                     NEUROLOGIC EXAMINATION    Neurologic Exam  Mental status    Alert and oriented x 3; intact memory with no confusion, speech or language problems; no hallucinations or delusions  Fund of information appropriate for level of education    Cranial nerves    II - visual fields intact to confrontation bilaterally  III, IV, VI - extra-ocular muscles full: no pupillary defect; no CORA, no nystagmus, no ptosis   V - normal facial sensation                                                               VII - normal facial symmetry                                                             VIII - intact hearing                                                                             IX, X - symmetrical palate                                                                  XI - symmetrical shoulder shrug                                                       XII - tongue midline without atrophy or fasciculation      Motor function  Normal muscle bulk and tone; strength 5/5 on all 4 extremities, no pronator drift      Sensory function Intact to light touch, pinprick, vibration, proprioception on all 4 extremities      Cerebellar Intact fine motor movement. No involuntary movements or tremors.  No ataxia or dysmetria on finger to nose or heel to shin testing      Reflex function DTR 2+ on bilateral UE and LE, symmetric. Down going toes bilaterally      Gait                   normal base and arm swing                  Medical Decision Making: In summary, your patient, Felicia King exhibits the following, with associated plan:    Relapsing remitting multiple sclerosis currently stable on IV ocrevus  Continue IV ocrevus at the [address] [place] [address] to follow with the [facility] with a visit scheduled for December 2022 with pre-visit surveillance MRIs  Continue oxybutynin for bladder dysfunction   Low back pain with radiation into the left lower extremity  Obtain MRI of the lumbar spine WO contrast  Continue gabapentin 100 mg three times daily   Return for follow up visit on annual basis as his MS is under the care of the [facility]             Signed: Armando Renee CNP      *Please note that portions of this note were completed with a voice recognition program.  Although every effort was made to insure the accuracy of this automated transcription, some errors in transcription may have occurred, occasionally words and are mis-transcribed    Provider Attestation: The documentation recorded by the scribe accurately reflects the service I personally performed and the decisions made by myself. Portions of this note were transcribed by a scribe. I personally performed the history, physical exam, and medical decision-making and confirm the accuracy of the information in the transcribed note. Scribe Attestation:   By signing my name below, Isabell Angelo, attest that this documentation has been prepared under the direction and in the presence of Armando Renee CNP.

## 2022-10-06 ENCOUNTER — HOSPITAL ENCOUNTER (OUTPATIENT)
Dept: MRI IMAGING | Age: 57
Discharge: HOME | End: 2022-10-08
Payer: COMMERCIAL

## 2022-10-06 DIAGNOSIS — M54.16 LUMBAR RADICULOPATHY: ICD-10-CM

## 2022-10-06 PROCEDURE — 72148 MRI LUMBAR SPINE W/O DYE: CPT

## 2022-10-19 ENCOUNTER — HOSPITAL ENCOUNTER (OUTPATIENT)
Dept: PHYSICAL THERAPY | Facility: CLINIC | Age: 57
Setting detail: THERAPIES SERIES
Discharge: HOME | End: 2022-10-19
Payer: COMMERCIAL

## 2022-10-19 PROCEDURE — 97140 MANUAL THERAPY 1/> REGIONS: CPT

## 2022-10-19 PROCEDURE — 97110 THERAPEUTIC EXERCISES: CPT

## 2022-10-19 PROCEDURE — 97161 PT EVAL LOW COMPLEX 20 MIN: CPT

## 2022-10-19 NOTE — CONSULTS
[] Cancer [] Arthritis [x] Other: R&R MS (diagnosed 2010, currently stable), memory loss              [x] Refer to full medical chart  In EPIC       Comorbidities:   [x] Obesity [] Dialysis  [] N/A   [] Asthma/COPD [] Dementia [] Other:    [] Stroke [] Sleep apnea [] Other:   [] Vascular disease [] Rheumatic disease [] Other:     Tests: [] X-Ray: [x] MRI:  [] Other:   9/15/22  Impression   Remote compression deformity at L5. Degenerative change with mild-to-moderate bilateral foraminal narrowing at   L4-5 and moderate bilateral foraminal narrowing at L5-S1. Medications: [x] Refer to full medical record [] None [] Other:  Allergies:      [x] Refer to full medical record [] None [] Other:    Function:  Hand Dominance  [] Right  [] Left  Patient lives with: Wife   In what type of home []  One story   [x] Two story   [] Split level   Number of stairs to enter 6 steps    With handrail on the [x]  Right to enter   [] Left to enter   Bathroom has a []  Tub only  [] Tub/shower combo   [x] Walk in shower    []  Grab bars   Washing machine is on []  Main level   [] Second level   [] Basement   Employer    Job Status []  Normal duty   [] Light duty   [] Off due to condition    []  Retired   [] Not employed   [x] Disability  [] Other:  []  Return to work:    Work activities/duties Yard work, 5 acres, 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        ADL/IADL Previous level of function Current level of function Who currently assists the patient with task   Bathing  [x] Independent  [] Assist [x] Independent  [] Assist    Dress/grooming [x] Independent  [] Assist [x] Independent  [] Assist    Transfer/mobility [x] Independent  [] Assist [x] Independent  [] Assist Not limited    Feeding [x] Independent  [] Assist [x] Independent  [] Assist    Toileting [x] Independent  [] Assist [x] Independent  [] Assist    Driving [x] Independent  [] Assist [x] Independent  [] Assist Increased pain with sitting for long periods of time Housekeeping [x] Independent  [] Assist [x] Independent  [] Assist    Grocery shop/meal prep [x] Independent  [] Assist [x] Independent  [] Assist      Gait Prior level of function Current level of function    [x] Independent  [] Assist [x] Independent  [] Assist   Device: [x] Independent [x] Independent    [] Straight Cane [] Quad cane [] Straight Cane [] Quad cane    [] Standard walker [] Rolling walker   [] 4 wheeled walker [] Standard walker [] Rolling walker   [] 4 wheeled walker    [] Wheelchair [] Wheelchair     Pain:  [x] Yes  [] No Location: (L) hip   Pain Rating: (0-10 scale) 3/10 at rest, 6-7/10 in the morning   Pain altered Tx:  [] Yes  [x] No  Action:    Symptoms:  [] Improving [x] Worsening [] Same  Better:  [] AM    [] PM    [] Sit    [] Rise/Sit    []Stand    [x] Walk    [] Lying    [x] Other: Ibuprofen, heat  Worse: [] AM    [] PM    [x] Sit    [x] Rise/Sit    []Stand    [] Walk    [x] Lying    [] Bend                      [] Valsalva    [] Other:   Sleep: [x] OK    [] Disturbed    Objective:   STRENGTH  ROM    Left Right Cervical    L1-2 Hip Flex 4 4 Flexion    Hip Abd 4 4 Extension    Hip Ext 4 4      Knee flexion 4 5      L3-4 Knee Ext 5 5 Rotation L R   L4 Ankle DF 5 5 Sidebend L R   L5 EHL 5 5 Retraction    S1 Plant.  Flex 5 5 Lumbar    Abdominals   Flexion 100%    Erector Spinae   Extension 100%      Rotation L 100% R  100%      Sidebend L   100% R 100%      UE/LE                                               TESTS (+/-) LEFT RIGHT Not Tested   SLR [] sit [x] supine    []   Hamstring (90/90) 135 deg 150 deg  [x]   SKTC (-) (-) []   DKTC (-) (-) []   Slump/Dural   [x]   SI JT (-) (-) []   NU (-) (+) []   FADDIR (+) (-) []       OBSERVATION No Deficit Deficit Not Tested Comments   Posture       Forward Head [] [x] []    Rounded Shoulders [] [x] []    Kyphosis [] [] []    Lordosis [] [x] [] increased   Lateral Shift [] [] []    Scoliosis [] [] []    Iliac Crest [] [x] [] (L) iliac crest elevated    PSIS [] [x] [] (L) PSIS lower   ASIS [] [x] [] (L) ASIS elevated   Genu Valgus [] [x] []    Genu Varus [x] [] []    Genu Recurvatum [x] [] []    Palpation [] [x] [] TTP (L) hip flexor, (B) piriformis    Sensation [x] [] []    Gait Analysis  [] [x] [] Patient ambulates with decreased (R) UE swing, (B) trendelenburg and increased forward trunk flexion    6 min walk test [] [] [x]        Functional Test: MICHELLE Score: 22% functionally impaired     Comments:    Assessment:  Patient is a 65year old male who presents to physical therapy with (L) hip and (L) LE radicular pain. Patient demonstrates improvements in pain tolerance, hamstring length, pelvic alignment, (B) LE strength and gait mechanics. These impairments affect the patient's ability to complete ADLs, household related tasks and yard work without increased pain. Patient would benefit from skilled physical therapy in order to improve impairments and improve overall quality of life. Educated patient on evaluation findings and poc. Educated patient on pelvic alignment. Discussed origin and insertion of piriformis and how increased tension in this muscle can increased sciatic pain symptoms. Patient verbalized good understanding of all education at this time. Problems:    [x] ? Pain:  [x] ? ROM:  [x] ? Strength:  [x] ? Function:  [] Other:      STG: (to be met in 6 treatments)  ? Pain: Patient will report pain as 1/10 at rest   ? ROM: Patient will improve (L) 90/90 knee extension to 140 deg in order to indicate improved hamstring length and decrease tension within posterior chain  Patient will demonstrate equal pelvic alignment   Patient to be independent with home exercise program as demonstrated by performance with correct form without cues.   Demonstrate Knowledge of fall prevention  LTG: (to be met in 12 treatments)  Patient will improve MICHELLE to 12% functionally impaired in order to indicate improved overall quality of life  Patient will improve (B) LE strength to 5/5 in order to improve gait mechanics   Patient will be able to ambulate around the clinic independently with decreased forward trunk flexion and (B) hip drop  Patient will report pain as 2/10 at the worst       Patient goals: \"Decrease pain\"    Rehab Potential:  [x] Good  [] Fair  [] Poor   Suggested Professional Referral:  [x] No  [] Yes:  Barriers to Goal Achievement:  [x] No  [] Yes:  Domestic Concerns:  [x] No  [] Yes:    Pt. Education:  [x] Plans/Goals, Risks/Benefits discussed  [x] Home exercise program  Access Code: [facility]  URL: [email or web address]  Date: 10/19/2022  Prepared by: Martine Dhillon    Exercises  Seated Gastroc Stretch with Strap - 2 x daily - 7 x weekly - 3 sets - 30 sec hold  Seated Hamstring Stretch - 2 x daily - 7 x weekly - 3 sets - 30 sec hold  Supine Piriformis Stretch with Foot on Ground - 2 x daily - 7 x weekly - 3 sets - 30 sec hold  Supine Bridge - 2 x daily - 7 x weekly - 2 sets - 10 reps  Clamshell - 2 x daily - 7 x weekly - 2 sets - 10 reps  Sidelying Hip Abduction - 2 x daily - 7 x weekly - 2 sets - 10 reps      Method of Education: [x] Verbal  [x] Demo  [x] Written  Comprehension of Education:  [x] Verbalizes understanding. [x] Demonstrates understanding. [x] Needs Review. [] Demonstrates/verbalizes understanding of HEP/Ed previously given.     Treatment Plan:  [x] Therapeutic Exercise   18420  [] Iontophoresis: 4 mg/mL Dexamethasone Sodium Phosphate  mAmin  63235   [x] Therapeutic Activity  53370 [x] Vasopneumatic cold with compression  03864    [] Gait Training   56118 [] Ultrasound   30050   [] Neuromuscular Re-education  98715 [] Electrical Stimulation Unattended  18448   [x] Manual Therapy  51802 [] Electrical Stimulation Attended  08557   [x] Instruction in HEP  [] Lumbar/Cervical Traction  10372   [] Aquatic Therapy   75491 [x] Cold/hotpack    [] Massage   99280      [] Dry Needling, 1 or 2 muscles  68966   [] Biofeedback, first 15 minutes   [id number]  [] Biofeedback, additional 15 minutes   15802 [] Dry Needling, 3 or more muscles  95934     [x]  Medication allergies reviewed for use of    Dexamethasone Sodium Phosphate 4mg/ml     with iontophoresis treatments. Pt is not allergic. Frequency:  2 x/week for 12 visits    Todays Treatment:  Modalities:   Precautions: Relapsing and Remitting MS (currently stable)   Exercises:  Exercise Reps/ Time Weight/ Level Comments   Nustep            SB S 30\"x3     HS S 30\"x3     Piriformis S 30\"x3           SLR      Bridges x10     Clamshells x10     SL Hip abd x10     SLS      Other: STM to (B) piriformis-8'    Specific Instructions for next treatment: progress (B) hip strength, education on gait mechanics, manual to (B) piriformis and hamstrings      Evaluation Complexity:  History (Personal factors, comorbidities) [x] 0 [] 1-2 [] 3+   Exam (limitations, restrictions) [x] 1-2 [] 3 [] 4+   Clinical presentation (progression) [x] Stable [] Evolving  [] Unstable   Decision Making [x] Low [] Moderate [] High    [x] Low Complexity [] Moderate Complexity [] High Complexity       Treatment Charges: Mins Units   [x] Evaluation       [x]  Low       []  Moderate       []  High 32 1   []  Modalities     [x]  Ther Exercise 20 1   [x]  Manual Therapy 8 1   []  Ther Activities     []  Aquatics     []  Vasocompression     []  Other       TOTAL TREATMENT TIME: 60 min    Time in: 1:58 pm      Time out: 3:00 pm    Electronically signed by: Krish Edouard PT        Physician Signature:________________________________Date:__________________  By signing above or cosigning this note, I have reviewed this plan of care and certify a need for medically necessary rehabilitation services.      *PLEASE SIGN ABOVE AND FAX BACK ALL PAGES*

## 2022-10-21 ENCOUNTER — HOSPITAL ENCOUNTER (OUTPATIENT)
Dept: PHYSICAL THERAPY | Facility: CLINIC | Age: 57
Setting detail: THERAPIES SERIES
Discharge: HOME | End: 2022-10-21
Payer: COMMERCIAL

## 2022-10-21 PROCEDURE — 97110 THERAPEUTIC EXERCISES: CPT

## 2022-10-21 PROCEDURE — 97140 MANUAL THERAPY 1/> REGIONS: CPT

## 2022-10-21 NOTE — FLOWSHEET NOTE
[x] [address]    Outpatient Rehabilitation &  Therapy  [address] [facility]  P: [phone]  F: [phone]      Physical Therapy Daily Treatment Note    Date:  10/21/2022  Patient: Guillermo Johnson                   : 1965                        MRN: [id number]  Physician: Elena Hall CNP                              Insurance: [facility] (60/60 visits approved)   Medical Diagnosis:   Diagnosis   M54.16 (ICD-10-CM) - Lumbar radiculopathy   Rehab Codes: M54.42, M62.81, R26.89  Onset Date: 22                             Next 's appt.: PRN    Visit# / total visits:      Cancels/No Shows:     Subjective:  pt arrived reporting some pain in lower back, noting he over did it yesterday. Pain:  [x] Yes  [] No Location: lower back Pain Rating: (0-10 scale) 2/10  Pain altered Tx:  [] No  [] Yes  Action:  Comments:    Todays Treatment:  Modalities:   Precautions: Relapsing and Remitting MS (currently stable)   Exercises:  Exercise Reps/ Time Weight/ Level Comments   Nustep 7' L1.5               SB S 30\"x3       HS S 30\"x3       Piriformis S 30\"x3                 SLR 2x10       Bridges 2x10       Clamshells 2x10       SL Hip abd 2x10             TG squats 2x10 L14 Increase next visit             Other: STM to (B) piriformis-10'     Specific Instructions for next treatment: progress (B) hip strength, education on gait mechanics, manual to (B) piriformis and hamstrings      Treatment Charges: Mins Units   []  Modalities     [x]  Ther Exercise 30 2   [x]  Manual Therapy 10 1   []  Ther Activities     []  Aquatics     []  Vasocompression     []  Other     Total Treatment time 40 3       Assessment: [x] Progressing toward goals. Initiated treatment on nustep for warmup followed by stretches and exercises per chart with good tolerance. Pt needing vocal cues for decreased hip rotation with clamshells and decreased hip flexion with SL hip abduction with good tolerance.  Pt having some tightness and tenderness in louise piriformis but noting it feels better since eval. Reviewed HEP with good understanding noted. Continue to progress hip strengthening as pain allows. [] No change. [] Other:      STG: (to be met in 6 treatments)  ? Pain: Patient will report pain as 1/10 at rest   ? ROM: Patient will improve (L) 90/90 knee extension to 140 deg in order to indicate improved hamstring length and decrease tension within posterior chain  Patient will demonstrate equal pelvic alignment   Patient to be independent with home exercise program as demonstrated by performance with correct form without cues. Demonstrate Knowledge of fall prevention  LTG: (to be met in 12 treatments)  Patient will improve MICHELLE to 12% functionally impaired in order to indicate improved overall quality of life  Patient will improve (B) LE strength to 5/5 in order to improve gait mechanics   Patient will be able to ambulate around the clinic independently with decreased forward trunk flexion and (B) hip drop  Patient will report pain as 2/10 at the worst         Patient goals: \"Decrease pain\"    Pt. Education:  [x] Yes  [] No  [x] Reviewed Prior HEP/Ed  Method of Education: [x] Verbal  [x] Demo  [] Written  Comprehension of Education:  [x] Verbalizes understanding. [x] Demonstrates understanding. [] Needs review. [] Demonstrates/verbalizes HEP/Ed previously given. Plan: [x] Continue with current plan of care towards goals.         Time In:11:00 am            Time Out: 11:50 am    Electronically signed by:  Agnieszka Keen PTA

## 2022-10-25 ENCOUNTER — HOSPITAL ENCOUNTER (OUTPATIENT)
Dept: PHYSICAL THERAPY | Facility: CLINIC | Age: 57
Setting detail: THERAPIES SERIES
Discharge: HOME | End: 2022-10-25
Payer: COMMERCIAL

## 2022-10-25 NOTE — FLOWSHEET NOTE
[] [facility] ([facility]) [facility] &  Therapy  [address]    [phone]  F: [phone]   [] [address]  [facility] [address]   [address]  P: [phone]  F: [phone]  [] [address] &  Therapy  [address]  P: [phone]  F: [phone] [] [address]  P: [phone]  F: [phone]  [] [address]  [address] [address] [facility] [address]   [address]   [place]: [phone]  F: [phone]   [] [place] [address] [facility] [address]  [place]: [phone]   F: [phone]     Physical Therapy Cancel/No Show note    Date: 10/25/2022  Patient: Khang Gonzales  : 1965  MRN: [id number]    Cancels/No Shows to date:     For today's appointment patient:    [x]  Cancelled    [] Rescheduled appointment    [] No-show     Reason given by patient:    []  Patient ill    []  Conflicting appointment    [x] No transportation      [] Conflict with work    [] No reason given    [] Weather related    [] [id number]    [] Other:      Comments:        [x] Next appointment was confirmed    Electronically signed by: Norman Ortiz

## 2022-10-27 ENCOUNTER — HOSPITAL ENCOUNTER (OUTPATIENT)
Dept: PHYSICAL THERAPY | Facility: CLINIC | Age: 57
Setting detail: THERAPIES SERIES
Discharge: HOME | End: 2022-10-27
Payer: COMMERCIAL

## 2022-10-27 PROCEDURE — 97110 THERAPEUTIC EXERCISES: CPT

## 2022-10-27 NOTE — FLOWSHEET NOTE
[x] [address]    Outpatient Rehabilitation &  Therapy  [address] [facility]  P: [phone]  F: [phone]      Physical Therapy Daily Treatment Note    Date:  10/27/2022  Patient: Emily Woodall                   : 1965                        MRN: [id number]  Physician: Yomaira Toribio CNP                              Insurance: [facility] (60/60 visits approved)   Medical Diagnosis:   Diagnosis   M54.16 (ICD-10-CM) - Lumbar radiculopathy   Rehab Codes: M54.42, M62.81, R26.89  Onset Date: 22                             Next 's appt.: PRN    Visit# / total visits: 3/12     Cancels/No Shows:     Subjective:  pt arrived reporting slight decrease in pain. Notes he feels it is improving, with minor soreness after last visit. Notes he wishes he could do HEP more often. Pain:  [x] Yes  [] No Location: lower back Pain Rating: (0-10 scale) 1/10  Pain altered Tx:  [] No  [] Yes  Action:  Comments:    Todays Treatment:  Modalities:   Precautions: Relapsing and Remitting MS (currently stable)   Exercises:  Exercise Reps/ Time Weight/ Level Comments   Nustep 7' L1.5               SB S 30\"x3       HS S 30\"x3       Piriformis S 30\"x3                 SLR 2x10       Bridges 2x10       Clamshells 2x10       SL Hip abd 2x10       Prone hip extension  2x10  Glut max         TG squats 2x10 L18    3 way hip 10x orange  bilat    Monsterwalks  2 laps  orange    Other: STM to (B) piriformis-10' HELD 10/27     Specific Instructions for next treatment: issue orange band and 3 way hip and monsterwalks for HEP. Treatment Charges: Mins Units   []  Modalities     [x]  Ther Exercise 40 3   []  Manual Therapy     []  Ther Activities     []  Aquatics     []  Vasocompression     []  Other     Total Treatment time 40 3       Assessment: [x] Progressing toward goals. Progressed pt with adding in prone hip extension, 3 way hip, and monsterwalks as well as increased level for TG squats with good tolerance.  Pt needing vocal cues for slow controlled motion as well as upright posture for 3 way hip with good carryover noted. Pt also needing cues for decreased hip movement with monsterwalks with good carryover noted. No increase in pain noted post treatment just muscle fatigue and soreness. Continue to progress as tolerable. [] No change. [] Other:      STG: (to be met in 6 treatments)  ? Pain: Patient will report pain as 1/10 at rest   ? ROM: Patient will improve (L) 90/90 knee extension to 140 deg in order to indicate improved hamstring length and decrease tension within posterior chain  Patient will demonstrate equal pelvic alignment   Patient to be independent with home exercise program as demonstrated by performance with correct form without cues. Demonstrate Knowledge of fall prevention  LTG: (to be met in 12 treatments)  Patient will improve MICHELLE to 12% functionally impaired in order to indicate improved overall quality of life  Patient will improve (B) LE strength to 5/5 in order to improve gait mechanics   Patient will be able to ambulate around the clinic independently with decreased forward trunk flexion and (B) hip drop  Patient will report pain as 2/10 at the worst         Patient goals: \"Decrease pain\"    Pt. Education:  [x] Yes  [] No  [x] Reviewed Prior HEP/Ed  Method of Education: [x] Verbal  [x] Demo  [] Written  Comprehension of Education:  [x] Verbalizes understanding. [x] Demonstrates understanding. [] Needs review. [] Demonstrates/verbalizes HEP/Ed previously given. Plan: [x] Continue with current plan of care towards goals.         Time In:11:00 am            Time Out:   11:47 am    Electronically signed by:  Clifton Cleary PTA

## 2022-10-31 ENCOUNTER — HOSPITAL ENCOUNTER (OUTPATIENT)
Dept: PHYSICAL THERAPY | Facility: CLINIC | Age: 57
Setting detail: THERAPIES SERIES
Discharge: HOME | End: 2022-10-31
Payer: COMMERCIAL

## 2022-10-31 PROCEDURE — 97110 THERAPEUTIC EXERCISES: CPT

## 2022-10-31 NOTE — FLOWSHEET NOTE
[x] [address]    Outpatient Rehabilitation &  Therapy  [address] [place] [address]  P: [phone]  F: [phone]     Physical Therapy Daily Treatment Note    Date:  10/31/2022  Patient: Enrique Arce                   : 1965                        MRN: [id number]  Physician: Marvel Costa CNP                              Insurance: [employer] (60/60 visits approved)   Medical Diagnosis:   Diagnosis   M54.16 (ICD-10-CM) - Lumbar radiculopathy   Rehab Codes: M54.42, M62.81, R26.89  Onset Date: 22                             Next 's appt.: PRN    Visit# / total visits:      Cancels/No Shows:     Subjective:    Pain:  [x] Yes  [] No Location: lower back Pain Rating: (0-10 scale) 1/10  Pain altered Tx:  [] No  [] Yes  Action:  Comments: Pt mentioned having some pain down his L leg when he woke up this morning. Todays Treatment:  Modalities:   Precautions: Relapsing and Remitting MS (currently stable)   Exercises:  Exercise Reps/ Time Weight/ Level Comments    [place] 10'    x              SB S 30\"x3     x   HS S 30\"x3     x   Piriformis S 30\"x3                   SLR 2x10     x   Bridges 2x10 [place]       Clamshells 2x10 [place]       SL Hip abd 2x10 [place]       Prone hip extension  2x10  Glut max x          TG squats 2x10 L18  x   3 way hip 10x orange  bilat  x   Monsterwalks  2 laps  orange  x   Other:       Specific Instructions for next treatment:      Treatment Charges: Mins Units   []  Modalities     [x]  Ther Exercise 40 3   []  Manual Therapy     []  Ther Activities     []  Aquatics     []  Vasocompression     []  Other     Total Treatment time 40 3   Access Code: [email or web address]  URL: [email or web address]  Date: 10/31/2022  Prepared by: Andrew Quiles    Exercises  Hip Extension with Resistance Loop - 1 x daily - 3 x weekly - 2 sets - 10 reps  Hip Abduction with Resistance Loop - 1 x daily - 3 x weekly - 2 sets - 10 reps  Standing Hip Flexion with Resistance Loop - 1 x daily - 3 x weekly - 2 sets - 10 reps  Side Stepping with Resistance at Ankles - 1 x daily - 3 x weekly - 3 sets - 10 reps      Assessment: [x] Progressing toward goals. [] No change. [x] Other: Progressed pt program to riding the bike for his warm up. Performed hip exercises and monster walks with the orange band. Provided pt with orange band and written HEP. Added in band resistance to bridges, clams, and side lying abd. Pt with no increased symptoms at the end of session. STG: (to be met in 6 treatments)  ? Pain: Patient will report pain as 1/10 at rest   ? ROM: Patient will improve (L) 90/90 knee extension to 140 deg in order to indicate improved hamstring length and decrease tension within posterior chain  Patient will demonstrate equal pelvic alignment   Patient to be independent with home exercise program as demonstrated by performance with correct form without cues. Demonstrate Knowledge of fall prevention  LTG: (to be met in 12 treatments)  Patient will improve MICHELLE to 12% functionally impaired in order to indicate improved overall quality of life  Patient will improve (B) LE strength to 5/5 in order to improve gait mechanics   Patient will be able to ambulate around the clinic independently with decreased forward trunk flexion and (B) hip drop  Patient will report pain as 2/10 at the worst         Patient goals: \"Decrease pain\"    Pt. Education:  [x] Yes  [] No  [x] Reviewed Prior HEP/Ed  Method of Education: [x] Verbal  [x] Demo  [] Written  Comprehension of Education:  [x] Verbalizes understanding. [x] Demonstrates understanding. [] Needs review. [] Demonstrates/verbalizes HEP/Ed previously given. Plan: [x] Continue with current plan of care towards goals.         Time In:2:30 pm            Time Out:   3:23 pm    Electronically signed by:  Terrell Garzon PTA

## 2022-11-02 ENCOUNTER — HOSPITAL ENCOUNTER (OUTPATIENT)
Dept: PHYSICAL THERAPY | Facility: CLINIC | Age: 57
Setting detail: THERAPIES SERIES
Discharge: HOME | End: 2022-11-02

## 2022-11-02 NOTE — FLOWSHEET NOTE
[] [facility]) [facility] &  Therapy  [address]    [phone]  F: [phone]   [] [address]  [facility]   [address]  P: [phone]  F: [phone]  [] [address] &  Therapy  [address]  P: [phone]  F: [phone] [] [address]  P: [phone]  F: [phone]  [] [address]  [address] [address] [facility] [address]   [address]   [place]: [phone]  F: [phone]   [] [address]  [place]: [phone]   F: [phone]     Physical Therapy Cancel/No Show note    Date: 2022  Patient: Enrique Arce  : 1965  MRN: [id number]    Cancels/No Shows to date:     For today's appointment patient:    [x]  Cancelled    [] Rescheduled appointment    [] No-show     Reason given by patient:    [x]  Patient ill    []  Conflicting appointment    [] No transportation      [] Conflict with work    [] No reason given    [] Weather related    [] [id number]    [] Other:      Comments:        [x] Next appointment was confirmed    Electronically signed by: Rita Stark

## 2023-02-20 ENCOUNTER — HOSPITAL ENCOUNTER (OUTPATIENT)
Dept: PHYSICAL THERAPY | Facility: CLINIC | Age: 58
Setting detail: THERAPIES SERIES
Discharge: HOME | End: 2023-02-20
Payer: COMMERCIAL

## 2023-02-20 PROCEDURE — 97110 THERAPEUTIC EXERCISES: CPT

## 2023-02-20 PROCEDURE — 97161 PT EVAL LOW COMPLEX 20 MIN: CPT

## 2023-02-20 PROCEDURE — 97530 THERAPEUTIC ACTIVITIES: CPT

## 2023-02-22 ENCOUNTER — HOSPITAL ENCOUNTER (OUTPATIENT)
Dept: GENERAL RADIOLOGY | Age: 58
Discharge: HOME | End: 2023-02-24
Payer: COMMERCIAL

## 2023-02-22 ENCOUNTER — HOSPITAL ENCOUNTER (OUTPATIENT)
Age: 58
Discharge: HOME | End: 2023-02-24
Payer: COMMERCIAL

## 2023-02-22 DIAGNOSIS — M25.552 LEFT HIP PAIN: ICD-10-CM

## 2023-02-22 PROCEDURE — 73502 X-RAY EXAM HIP UNI 2-3 VIEWS: CPT

## 2023-02-26 NOTE — CONSULTS
[x] [address]  Outpatient Rehabilitation &  Therapy  [address] [facility]  P: [phone]  F: [phone] [] [address] [facility] [address]   [address]  P: [phone]  F: [phone]     Physical Therapy Pelvic Floor Evaluation    Date:  2023  Patient: Jhony Correa  : 1965  MRN: [id number]  Physician: Nikki Vela     Insurance: Lisayamel Nava (60 vs)  Medical Diagnosis: Voiding Dysfunction    Rehab Codes: M62.5, N93.43, N39.41  Onset Date: 1/10/23                                  Next 's appt: 23    Subjective:   CC:Pt is a 64 yo male with hx of MS, who presents with complaints of urinary urgency, post void dribble and some c/o Left hip pain. Reports symptoms have progressively worsened. Waking up 1-2 times per night and daytime variable on fluid intake. HPI: (onset date:1/10/23)     PMHx: [] Unremarkable        [] Diabetes     [x] HTN                        [] Pacemaker              [] MI/Heart Problems [] Cancer       [] Arthritis       [] Other:              [x] Refer to full medical chart  In EPIC      Comorbidities:   [] Obesity [] Dialysis  [x] Other:MS   [] Asthma/COPD [] Dementia [] Other:   [] Stroke [] Sleep apnea [] Other:   [] Vascular disease [] Rheumatic disease [] Other:      Tests:   [] X-Ray:        [] MRI:                      [] Other:     Medications: [x] Refer to full medical record          [] None          [] Other:  Allergies:       [x] Refer to full medical record  [] None          [] Other:     Function:  Hand Dominance  [x] Right  [] Left  Employer    Job Status []  Normal duty   [] Light duty   [] Off due to condition    []  Retired   [x] Not employed   [] Disability  [] Other:  []  Return to work:    Work activities/duties           ADL/IADL Previous level of function Current level of function Who currently assists the patient with task   Bathing  [x] Independent  [] Assist [x] Independent  [] Assist     Dress/grooming [x] Independent  [] Assist [x] Independent  [] Assist     Transfer/mobility [x] Independent  [] Assist [x] Independent  [] Assist     Feeding [x] Independent  [] Assist [x] Independent  [] Assist     Toileting [x] Independent  [] Assist [x] Independent  [] Assist     Driving [x] Independent  [] Assist [x] Independent  [] Assist     Housekeeping [x] Independent  [] Assist [x] Independent  [] Assist     Grocery shop/meal prep [x] Independent  [] Assist [x] Independent  [] Assist        Gait Prior level of function Current level of function     [x] Independent  [] Assist [x] Independent  [] Assist   Device: [x] Independent [x] Independent     [] Straight Cane [] Quad cane [] Straight Cane [] Quad cane     [] Standard walker [] Rolling walker   [] 4 wheeled walker [] Standard walker [] Rolling walker   [] 4 wheeled walker     [] Wheelchair [] Wheelchair      Pain:  [x] Yes  [] No  Location: Left post hip/buttock  Pain Rating: (0-10 scale) 3/10  Pain altered Tx:  [] Yes  [x] No  Action:     Symptoms:  [] Improving     [] Worsening  [x] Same  Better:  [] AM    [] PM    [] Sit    [] Rise/Sit    []Stand    [] Walk    [] Lying    [x] Other:moving around  Worse: [] AM    [] PM    [x] Sit    [] Rise/Sit    []Stand    [] Walk    [x] Lying    [] Bend                      [] Valsalva    [x] Other:  Sleep: [] OK    [x] Disturbed     Objective:             OBSERVATION  No Deficit  Deficit  Not Tested  Comments    External  X          Posture  X          Pelvic alignment    x   Left post innominate rotation, Left sacral rotation     Muscle Spasm   x    Increased tension L>R piriformis      Scarring  X          Diastasis  X          EAS  X         Perineal Descent   X         Skin Condition  X          Excursion    X   Min excursion    External Clock  X          Internal **       Internal exam deferred at this time     Internal clock  X          Muscle bulk   X       Muscle Power   x Muscle Endurance   x       Quality of Contraction   x       Specificity   x       Coordination   x        Sensation  X          **Pt consent provided for Internal exam:  [x] Yes, not performed this session   [] No  A chaperone was offered as part of the rooming process. Patient declined and agrees to continue with exam without a chaperone. Lumbar mobility screen: full, pain free        STRENGTH     Left Right   Hip Flex 5 5   Ext 5 5   ER 5 5   IR 5 5   ABD 5 5   ADD 5 5   *indicates pain production     FUNCTION  Normal  Difficult  Unable        Cough/Sneeze    x         Supine-Sit    x         Squatting    x         Bending/ stooping    x         Stairs    x         Hopping    x         Jumping    x         Running    x         Lifting/carrying    x              Functional Test: SETH-6 Score: 45.83% functionally impaired      Comments:     Assessment:   Patient noted with pelvic malalignment and weak PF musculature as well as inability to use correct musculature to regain control. Pt would benefit from skilled physical therapy services in order to improve function and PF strength via biofeedback, ES, exercise and education as appropriate. Problems:    [x] ? Pain:  [] ? ROM:  [x] ? Strength:  [x] ? Function:  [] Other:       STG: (to be met in 6 treatments)  PF contraction long enough to inhibit bladder contraction  Able to isolate PF musculature  ? Strength: PF 3/5  Able to start a stream with hesitancy  Normal pelvic alignment  Able to maintain voiding schedule of 2-3 hours  Patient to be independent with home exercise program as demonstrated by performance with correct form without cues. LTG: (to be met in 12 treatments)  0/10 pain  Able to maintain voiding schedule of 3-4 hours  No c/o post void dribble  Perform basic ADL's without leaking  3. Pt will report a 4-5 point improvement on SETH to indicate improved urogenital functioning.      Patient goals:Bladder improvement     Rehab Potential:  [x] Good [] Fair  [] Poor              Suggested Professional Referral:  [x] No  [] Yes:  Barriers to Goal Achievement:  [x] No  [] Yes:  Domestic Concerns:  [x] No  [] Yes:     Pt. Education:  [x] Plans/Goals, Risks/Benefits discussed  [x] Home exercise program  Method of Education: [x] Verbal  [x] Demo  [x] Written(Male PF picture explanation, urine stop test, PF ex short and long holds, The knack ex, Easy Kegel avni, MET correction, supine PRC ex, Piriformis stretches)  Comprehension of Education:  [x] Verbalizes understanding. [x] Demonstrates understanding. [] Needs Review. [] Demonstrates/verbalizes understanding of HEP/Ed previously given. Treatment Plan:  [x] Therapeutic Exercise   74140             [] Iontophoresis: 4 mg/mL Dexamethasone Sodium Phosphate  mAmin  78335   [x] Therapeutic Activity  49696 [] Vasopneumatic cold with compression  08277               [] Gait Training    64840 [] Ultrasound        37637   [x] Neuromuscular Re-education  65588 [x] Electrical Stimulation Unattended  90107   [x] Manual Therapy  70648 [] Electrical Stimulation Attended  48482   [x] Instruction in HEP       [] Lumbar/Cervical Traction  61450   [] Aquatic Therapy           41486 [x] Cold/hotpack     [] Massage   98608      [] Dry Needling, 1 or 2 muscles  60698   [x] Biofeedback, first 15 minutes   02415  [] Biofeedback, additional 15 minutes   41864 [] Dry Needling, 3 or more muscles  38658      Frequency:  2x/week for 12visits     Todays Treatment:  Modalities:   Exercises:  Exercise Reps/ Time Weight/ Level Comments   Male PF pic explanation [x]      Urine stop test  [x]        PF ex: short holds  40-50x  1-2 sec     PF ex: long holds  40-50x  10 x 5 sec     The knack ex  [x]         MET 10x 5 sec       Supine PRC ex 20x     Piriformis stretches 5x 10 sec     Other:     Specific Instructions for next treatment:Biofeedback, based on results initiate ES, modalities, down training. Recheck pelvic alignment. Progressive ex, Re     Evaluation Complexity:  History (Personal factors, comorbidities) [x] 0 [] 1-2 [] 3+   Exam (limitations, restrictions) [x] 1-2 [] 3 [] 4+   Clinical presentation (progression) [x] Stable [] Evolving  [] Unstable   Decision Making [x] Low [] Moderate [] High     [x] Low Complexity [] Moderate Complexity [] High Complexity         Treatment Charges: Mins Units   [x] Evaluation       [x]  Low       []  Moderate       []  High 20 1   []  Modalities       [x]  Ther Exercise 15 1   []  Manual Therapy       [x]  Ther Activities 10  1   []  Aquatics       []  Vasocompression       []  Other          TOTAL TREATMENT TIME: 45     Time in:  0930   Time out: 1020     Electronically signed by: Stepan Umaña PT     Physician Signature:________________________________Date:__________________  By signing above or cosigning this note, I have reviewed this plan of care and certify a need for medically necessary rehabilitation services.       *PLEASE SIGN ABOVE AND FAX BACK ALL PAGES*

## 2023-02-27 ENCOUNTER — HOSPITAL ENCOUNTER (OUTPATIENT)
Dept: PHYSICAL THERAPY | Facility: CLINIC | Age: 58
Setting detail: THERAPIES SERIES
Discharge: HOME | End: 2023-02-27
Payer: COMMERCIAL

## 2023-02-27 PROCEDURE — 90912 BFB TRAINING 1ST 15 MIN: CPT

## 2023-02-27 PROCEDURE — G0283 ELEC STIM OTHER THAN WOUND: HCPCS

## 2023-02-27 PROCEDURE — 97110 THERAPEUTIC EXERCISES: CPT

## 2023-02-27 NOTE — FLOWSHEET NOTE
[] [address]  [address]  [phone]  F: [phone] [] [address]  [facility]   [address]  P: [phone]  F: [phone] [x] [address] &  Therapy  [address]  P: [phone]  F: [phone] [] [address]  P: [phone]  F: [phone] [] [address]  [facility]   [address]   [place]: [phone]  F: [phone]      Physical Therapy Daily Treatment Note    Date:  2023  Patient Name:  Gino Jimenez    :  1965  MRN: [id number]  Physician: Lj Verma [address]: Tad Hidden (60 vs)  Medical Diagnosis: Voiding Dysfunction                    Rehab Codes: M62.5, N93.43, N39.41  Onset Date: 1/10/23                                  Next 's appt: 23     Visit# / total visits: ; Cancels/No Shows: 0    Subjective:    Pain:  [] Yes  [] No Location: *** N/A Pain Rating: (0-10 scale) ***/10  Pain altered Tx:  [] No  [] Yes  Action:  Comments:    Objective:  Modalities:   Precautions:  Exercises:  Exercise Reps/ Time Weight/ Level Comments                                                                                                                           Other:      Treatment Charges: Mins Units   []  Modalities     []  Ther Exercise     []  Manual Therapy     []  Ther Activities     []  Aquatics     []  Vasocompression     []  Other     Total Treatment time ***        Assessment: [] Progressing toward goals. [] No change. [] Other:  [] Patient would continue to benefit from skilled physical therapy services in order to: ***    STG/LTG    Pt.  Education:  [] Yes  [] No  [] Reviewed Prior HEP/Ed  Method of Education: [] Verbal  [] Demo  [] Written  Comprehension of Education:  [] Verbalizes understanding. [] Demonstrates understanding. [] Needs review. [] Demonstrates/verbalizes HEP/Ed previously given. Plan: [] Continue current frequency toward long and short term goals.     [] Specific Instructions for subsequent treatments: ***      Time In:***            Time Out: ***    Electronically signed by:  Cherie Valentine PT

## 2023-03-22 ENCOUNTER — HOSPITAL ENCOUNTER (OUTPATIENT)
Dept: PHYSICAL THERAPY | Facility: CLINIC | Age: 58
Setting detail: THERAPIES SERIES
Discharge: HOME | End: 2023-03-22
Payer: COMMERCIAL

## 2023-03-22 PROCEDURE — G0283 ELEC STIM OTHER THAN WOUND: HCPCS

## 2023-03-22 PROCEDURE — 90912 BFB TRAINING 1ST 15 MIN: CPT

## 2023-03-22 PROCEDURE — 97530 THERAPEUTIC ACTIVITIES: CPT

## 2023-03-26 NOTE — FLOWSHEET NOTE
[address]  [address]  [phone]  F: [phone] [] [address]  [facility] 36   [address]  P: [phone]  F: [phone] [x] [place] &  Therapy  [address]  P: [phone]  F: [phone] [] [address]  P: [phone]  F: [phone] [] [address]  [facility]   [address]   [place]: [phone]  F: [phone]      Physical Therapy Daily Treatment Note    Date:  3/22/2023  Patient Name:  Josse Boston    :  1965  MRN: [id number]  Physician: Lj Verma [address]: . [address] (60 vs)  Medical Diagnosis: Voiding Dysfunction                    Rehab Codes: M62.5, N93.43, N39.41  Onset Date: 1/10/23                                  Next 's appt: 23     Visit# / total visits: 3/8; Progress note for Medicare patient due at visit 8     Cancels/No Shows: 0    Subjective:    Pain:  [x] Yes  [] No Location: Left post hip/buttock  Pain Rating: (0-10 scale) 3/10  Pain altered Tx:  [] No  [] Yes  Action:  Comments: Pt was on vacation for about 3 weeks. States was not as good about doing ex as he should have been. Still noting urgency and states does note difficulty starting a stream. Had recent injection into Left hip for the pain but has not noted much relief yet.      Objective:  Modalities:   Precautions:  Exercises:  Exercise Reps/ Time Weight/ Level Comments   Male PF pic explanation [x]        Urine stop test  [x]        PF ex: short holds  40-50x  1-2 sec     PF ex: long holds  40-50x  10 x 5 sec     The knack ex  [x]         MET 10x 5 sec       Supine PRC ex 20x       Piriformis stretches 5x 10 sec       Other:      Treatment Charges: Mins Units   [x]

## 2023-03-29 ENCOUNTER — HOSPITAL ENCOUNTER (OUTPATIENT)
Age: 58
Discharge: HOME | End: 2023-03-29
Payer: COMMERCIAL

## 2023-03-29 LAB
CRP SERPL HS-MCNC: <3 MG/L (ref 0–5)
ERYTHROCYTE [SEDIMENTATION RATE] IN BLOOD BY WESTERGREN METHOD: 3 MM/HR (ref 0–20)

## 2023-03-29 PROCEDURE — 36415 COLL VENOUS BLD VENIPUNCTURE: CPT

## 2023-03-29 PROCEDURE — 85652 RBC SED RATE AUTOMATED: CPT

## 2023-03-29 PROCEDURE — 86140 C-REACTIVE PROTEIN: CPT

## 2023-04-05 ENCOUNTER — HOSPITAL ENCOUNTER (OUTPATIENT)
Dept: PHYSICAL THERAPY | Facility: CLINIC | Age: 58
Setting detail: THERAPIES SERIES
Discharge: HOME | End: 2023-04-05
Payer: COMMERCIAL

## 2023-04-05 PROCEDURE — 90912 BFB TRAINING 1ST 15 MIN: CPT

## 2023-04-05 PROCEDURE — 97110 THERAPEUTIC EXERCISES: CPT

## 2023-04-05 PROCEDURE — G0283 ELEC STIM OTHER THAN WOUND: HCPCS

## 2023-04-06 NOTE — FLOWSHEET NOTE
[] [address]  [address]  [phone]  F: [phone] [] [address]  [facility] 36   [address]  P: [phone]  F: [phone] [x] [place] &  Therapy  [address] [facility] [address]  P: [phone]  F: [phone] [] [address]  P: [phone]  F: [phone] [] [address]  [facility]   [address]   [place]: [phone]  F: [phone]      Physical Therapy Daily Treatment Note    Date:  2023  Patient Name:  Mars Portillo    :  1965  MRN: [id number]  Physician: Lj Verma St: John Moncada (60 vs)  Medical Diagnosis: Voiding Dysfunction                    Rehab Codes: M62.5, N93.43, N39.41  Onset Date: 1/10/23                                  Next 's appt: 23     Visit# / total visits: ; Progress note for Medicare patient due at visit 8     Cancels/No Shows: 0    Subjective:    Pain:  [x] Yes  [] No Location: Left post hip/buttock  Pain Rating: (0-10 scale) 3/10  Pain altered Tx:  [] No  [] Yes  Action:  Comments: Pt states his hip pain is about the same. Had been working on some trigger point relase. Breathing ex seems to be helping. Not as diligent about ex.     Objective:  Modalities:   Precautions:  Exercises:  Exercise Reps/ Time Weight/ Level Comments   Male PF pic explanation [x]        Urine stop test  [x]        PF ex: short holds  40-50x  1-2 sec     PF ex: long holds  40-50x  10 x 5 sec     The knack ex  [x]         MET 10x 5 sec       Supine PRC ex 20x       Piriformis stretches 5x 10 sec       Other:      Treatment Charges: Mins Units   [x]  Modalities: ES 15 1   [x]  Ther Exercise 15 1   []  Manual Therapy     []  Ther Activities     []  Aquatics     []

## 2023-10-27 ENCOUNTER — CLINICAL DOCUMENTATION (OUTPATIENT)
Dept: PHYSICAL THERAPY | Facility: CLINIC | Age: 58
End: 2023-10-27

## 2023-10-27 NOTE — THERAPY DISCHARGE
[x] [facility]  Outpatient Rehabilitation &  Therapy  [address] [facility]  P: [phone]  F: [phone]     Physical Therapy Discharge Note    Date: 10/27/2023     Patient: Hadley Wheatley  : 1965  MRN: [id number]    Physician: [address]: [facility] (60 vs)  Medical Diagnosis: Voiding Dysfunction                    Rehab Codes: M62.5, N93.43, N39.41  Onset Date: 1/10/23             Total visits attended:4  Cancels/No shows:  Date of initial visit: 23                Date of final visit: 23          Discharge Status:                               [] Pt recovered from conditions. Treatment goals were met. [] Pt received maximum benefit. No further therapy indicated at this time. [] Pt to continue exercise/home instructions independently. [] Therapy interrupted due to:                          [] Pt has 2 or more no shows/cancels, is discontinued per our policy. [] Pt has completed prescribed number of treatment sessions. [x] Patient did not schedule anymore appointments. Status unknown.                          [] Other:      [x] Discharge PT at this time        Electronically signed by Sharif Sweet PT on 10/27/2023 at 9:48 AM      If you have any questions or concerns, please don't hesitate to call.   Thank you for your referral.

## 2023-11-29 ENCOUNTER — OFFICE VISIT (OUTPATIENT)
Dept: NEUROLOGY | Age: 58
End: 2023-11-29
Payer: COMMERCIAL

## 2023-11-29 VITALS
HEART RATE: 63 BPM | WEIGHT: 222.2 LBS | BODY MASS INDEX: 31.11 KG/M2 | SYSTOLIC BLOOD PRESSURE: 183 MMHG | DIASTOLIC BLOOD PRESSURE: 110 MMHG | HEIGHT: 71 IN

## 2023-11-29 DIAGNOSIS — G35 RELAPSING REMITTING MULTIPLE SCLEROSIS (HCC): Primary | ICD-10-CM

## 2023-11-29 DIAGNOSIS — M62.838 MUSCLE SPASMS OF BOTH LOWER EXTREMITIES: ICD-10-CM

## 2023-11-29 PROCEDURE — G8484 FLU IMMUNIZE NO ADMIN: HCPCS | Performed by: PSYCHIATRY & NEUROLOGY

## 2023-11-29 PROCEDURE — G8427 DOCREV CUR MEDS BY ELIG CLIN: HCPCS | Performed by: PSYCHIATRY & NEUROLOGY

## 2023-11-29 PROCEDURE — 1036F TOBACCO NON-USER: CPT | Performed by: PSYCHIATRY & NEUROLOGY

## 2023-11-29 PROCEDURE — 3017F COLORECTAL CA SCREEN DOC REV: CPT | Performed by: PSYCHIATRY & NEUROLOGY

## 2023-11-29 PROCEDURE — 99214 OFFICE O/P EST MOD 30 MIN: CPT | Performed by: PSYCHIATRY & NEUROLOGY

## 2023-11-29 PROCEDURE — G8419 CALC BMI OUT NRM PARAM NOF/U: HCPCS | Performed by: PSYCHIATRY & NEUROLOGY

## 2023-11-29 RX ORDER — MODAFINIL 200 MG/1
200 TABLET ORAL 2 TIMES DAILY
COMMUNITY
Start: 2023-11-06

## 2023-11-29 RX ORDER — MONTELUKAST SODIUM 10 MG/1
10 TABLET ORAL NIGHTLY
COMMUNITY
Start: 2023-11-20

## 2023-11-29 RX ORDER — TADALAFIL 5 MG/1
5 TABLET ORAL PRN
COMMUNITY
Start: 2023-11-17

## 2023-11-29 RX ORDER — IPRATROPIUM BROMIDE 21 UG/1
SPRAY, METERED NASAL
COMMUNITY
Start: 2023-10-09

## 2023-11-29 RX ORDER — TIZANIDINE 4 MG/1
TABLET ORAL
Qty: 90 TABLET | Refills: 1 | Status: SHIPPED | OUTPATIENT
Start: 2023-11-29

## 2023-11-29 RX ORDER — AZELASTINE HYDROCHLORIDE 137 UG/1
SPRAY, METERED NASAL
COMMUNITY
Start: 2023-09-29

## 2023-11-29 RX ORDER — ALBUTEROL SULFATE 90 UG/1
AEROSOL, METERED RESPIRATORY (INHALATION)
COMMUNITY
Start: 2023-11-13

## 2023-11-29 RX ORDER — BACLOFEN 10 MG/1
10 TABLET ORAL PRN
COMMUNITY
Start: 2023-11-09

## 2023-11-29 NOTE — PROGRESS NOTES
05/17/2022    TSH 2.17 08/27/2021    GLUF 99 05/17/2022    [id number] 409 08/27/2021     Lab Results   Component Value Date    VITD25 68.1 06/09/2020     EEG (11/11/21): NORMAL    Ig G done through [facility] (5/17/2021): 582 (700-1600)  Ig M done through [facility] (5/17/2021): 7 ( )           MRI Brain (w/wo) at [facility] (12/13/2021):  Multiple intracranial white matter lesions compatible with multiple   sclerosis. No new T2 lesions and no new enhancing lesions. No  significant parenchymal volume loss. Other Significant Intracranial Findings:  None     MRI Cervical Spine (w/wo) at [facility] (12/13/2021):  Scattered intramedullary lesions compatible with the clinical history of  multiple sclerosis. No new T2 intramedullary lesions and no new  enhancing intramedullary lesions. No significant volume loss of the  upper spinal cord for age. Other Significant Cervical Spine Findings:  No significant cervical canal  or foraminal stenosis. MRI Brain (w/wo) 2/14/2023 done at [facility]: Multiple intracranial white matter lesions compatible with multiple   sclerosis. No new T2 lesions and no new enhancing lesions. Mild   parenchymal volume loss. MRI Cervical Spine (w/wo) on schedule to be done at [facility] in March 2024        Impression and Plan: Mr. Silvia Hartley is a 63 y.o. male with   Relapsing remitting multiple sclerosis since 2010; on disease modifying therapy with IV ocrelizumab., Last infusion on 7/31/2023; Multiple sclerosis has been stable without any exacerbations. Getting infusions through [facility]. Surveillance brain & spine MRIs at [facility] stable as above. Ocrevus monitoring/low immunoglobulin levels: following up with immunologist/MS specialist team at [facility]; he is following up with them for follow up on immunoglobin A, G, M levels at [facility]. Muscle spasms of bilateral LE; Tizanidine has been helpful; prescription refill given.     For his ongoing subjective memory difficulties (scored 30/30 on MMSE); B12, folate, TSH

## 2024-06-11 ENCOUNTER — OFFICE VISIT (OUTPATIENT)
Dept: NEUROLOGY | Age: 59
End: 2024-06-11
Payer: COMMERCIAL

## 2024-06-11 VITALS
SYSTOLIC BLOOD PRESSURE: 150 MMHG | DIASTOLIC BLOOD PRESSURE: 87 MMHG | BODY MASS INDEX: 29.68 KG/M2 | HEIGHT: 71 IN | WEIGHT: 212 LBS | HEART RATE: 79 BPM

## 2024-06-11 DIAGNOSIS — R53.83 OTHER FATIGUE: ICD-10-CM

## 2024-06-11 DIAGNOSIS — G35 RELAPSING REMITTING MULTIPLE SCLEROSIS (HCC): Primary | ICD-10-CM

## 2024-06-11 DIAGNOSIS — E55.9 VITAMIN D DEFICIENCY: ICD-10-CM

## 2024-06-11 DIAGNOSIS — M62.838 MUSCLE SPASMS OF BOTH LOWER EXTREMITIES: ICD-10-CM

## 2024-06-11 PROCEDURE — G8419 CALC BMI OUT NRM PARAM NOF/U: HCPCS | Performed by: PSYCHIATRY & NEUROLOGY

## 2024-06-11 PROCEDURE — 99214 OFFICE O/P EST MOD 30 MIN: CPT | Performed by: PSYCHIATRY & NEUROLOGY

## 2024-06-11 PROCEDURE — 1036F TOBACCO NON-USER: CPT | Performed by: PSYCHIATRY & NEUROLOGY

## 2024-06-11 PROCEDURE — 3017F COLORECTAL CA SCREEN DOC REV: CPT | Performed by: PSYCHIATRY & NEUROLOGY

## 2024-06-11 PROCEDURE — G8427 DOCREV CUR MEDS BY ELIG CLIN: HCPCS | Performed by: PSYCHIATRY & NEUROLOGY

## 2024-06-11 RX ORDER — DEXTROAMPHETAMINE SACCHARATE, AMPHETAMINE ASPARTATE, DEXTROAMPHETAMINE SULFATE AND AMPHETAMINE SULFATE 2.5; 2.5; 2.5; 2.5 MG/1; MG/1; MG/1; MG/1
10 TABLET ORAL DAILY
COMMUNITY
Start: 2024-05-14

## 2024-06-11 RX ORDER — MULTIVIT-MIN/IRON/FOLIC ACID/K 18-600-40
1 CAPSULE ORAL DAILY
Qty: 30 CAPSULE | Refills: 6 | Status: SHIPPED | OUTPATIENT
Start: 2024-06-11

## 2024-06-11 RX ORDER — TIZANIDINE 4 MG/1
TABLET ORAL
Qty: 90 TABLET | Refills: 1 | Status: SHIPPED | OUTPATIENT
Start: 2024-06-11

## 2024-06-11 NOTE — PROGRESS NOTES
NEUROLOGY FOLLOW-UP VISIT   Patient Name:       Andrei Boston  :        1965  Clinic Visit Date:    2024  LOV: 2023        Dear Yesenia Sterling MD     I saw Mr. Andrei Boston in follow-up in the office today in continuation of neurologic care.  As you know he  is a 59 y.o. right handed  retired  male with  Relapsing remitting multiple sclerosis since ; on disease modifying therapy with IV ocrelizumab.   He stated that he was seen in [facility] in 2024 and underwent MRI brain and cervical spine (w/wo) and those studies are without any new lesions.  They advised to continue IV Ocrevus. His last infusion was on .  He has been tolerating infusions well without any adverse effects.  He has not had any recurrence of symptomatology to suggest MS relapses.    2023 visit:  He comes to clinic stating that he has been following up with [facility] as well and and has upcoming appointment for MRI brain study.  He was advised by [facility] team to continue Ocrevus infusion along with Provigil.  He also was advised to follow-up with Ortho PT for his increased paresthesias with left hip pain.  He is also getting immunoglobulin levels checked with them.  Otherwise patient offers no new complaints.  He does have occasional headaches with neck pain with muscle spasms.  Requesting refill on muscle relaxant.    Last visit in 2022:  After last visit in 2021; he was evaluated at [facility] and they had done MRI brain and it was reportedly without any new lesions.    He also had low immunoglobulin levels for which evaluated by the immunologist at [facility] and he was advised additional vaccines including pneumococcal vaccine, flu vaccine and shingles vaccine.  He has upcoming blood work to follow-up on low immunoglobulin levels.  He has had Covid 19 vaccine x2 along with booster. He has done well with those.   Had IV ocrevus infusion towards end of 2021.  He

## 2024-09-04 LAB — PTH INTACT: 10 PG/ML

## 2025-04-23 ENCOUNTER — OFFICE VISIT (OUTPATIENT)
Dept: NEUROLOGY | Age: 60
End: 2025-04-23
Payer: COMMERCIAL

## 2025-04-23 VITALS
SYSTOLIC BLOOD PRESSURE: 143 MMHG | WEIGHT: 211.2 LBS | HEIGHT: 71 IN | BODY MASS INDEX: 29.57 KG/M2 | HEART RATE: 79 BPM | DIASTOLIC BLOOD PRESSURE: 92 MMHG

## 2025-04-23 DIAGNOSIS — R41.89 SUBJECTIVE COGNITIVE IMPAIRMENT: ICD-10-CM

## 2025-04-23 DIAGNOSIS — G35 RELAPSING REMITTING MULTIPLE SCLEROSIS (HCC): Primary | ICD-10-CM

## 2025-04-23 DIAGNOSIS — M62.838 MUSCLE SPASMS OF BOTH LOWER EXTREMITIES: ICD-10-CM

## 2025-04-23 PROCEDURE — G8427 DOCREV CUR MEDS BY ELIG CLIN: HCPCS | Performed by: PSYCHIATRY & NEUROLOGY

## 2025-04-23 PROCEDURE — G8419 CALC BMI OUT NRM PARAM NOF/U: HCPCS | Performed by: PSYCHIATRY & NEUROLOGY

## 2025-04-23 PROCEDURE — 1036F TOBACCO NON-USER: CPT | Performed by: PSYCHIATRY & NEUROLOGY

## 2025-04-23 PROCEDURE — 99214 OFFICE O/P EST MOD 30 MIN: CPT | Performed by: PSYCHIATRY & NEUROLOGY

## 2025-04-23 PROCEDURE — 3017F COLORECTAL CA SCREEN DOC REV: CPT | Performed by: PSYCHIATRY & NEUROLOGY

## 2025-04-23 RX ORDER — METHYLPREDNISOLONE 4 MG/1
TABLET ORAL
Qty: 21 TABLET | Refills: 0 | Status: SHIPPED | OUTPATIENT
Start: 2025-04-23 | End: 2025-04-29

## 2025-04-23 NOTE — PROGRESS NOTES
NEUROLOGY FOLLOW-UP VISIT   Patient Name:       Andrei Boston  :        1965  Clinic Visit Date:    2025  LOV: 2024         Dear Yesenia Sterling MD     I saw Mr. Andrei Boston in follow-up in the office today in continuation of neurologic care.  As you know he  is a 60 y.o. right handed  retired  male with  Relapsing remitting multiple sclerosis since ; on disease modifying therapy with IV ocrelizumab.  Following up at [facility] for infusions and with surveillance brain and spine imaging studies.  His last infusion was on 12/10/2024.   He has been having memory difficulties for which he had MoCA testing done today and he scored 29/30.  Then he stated that he is not bothered by memory difficulties but he has been having occasional neck pain with rare electric shocklike sensations spreading from neck to the shoulder.  Memory issues have not worsened and are primarily noticed when fatigued. He attributes these difficulties to age. Currently, he is under the care of the [facility] for infusions and diagnostic procedures. The most recent MRI was conducted on 03/10/2025, with a follow-up appointment scheduled for the subsequent week. Mower in daily activities is maintained, including driving, managing finances, and adhering to medication regimens. No symptoms of anxiety or depression are reported, and sleep patterns are normal, although excessive sleep is noted. Neuropsychological testing was performed twice, once post-diagnosis in  and again 2 to 3 years later, revealing some cognitive decline. He has been unemployed for approximately 10 years and is currently on disability. No balance issues or falls are reported. Participation in an experimental trial at the [facility], which includes physical therapy, is being considered and is scheduled to start in the fall.    Fatigue is managed with Adderall prescribed by his primary care physician. Gabapentin was

## 2025-05-06 ENCOUNTER — HOSPITAL ENCOUNTER (OUTPATIENT)
Dept: MRI IMAGING | Age: 60
Discharge: HOME | End: 2025-05-08
Attending: PSYCHIATRY & NEUROLOGY
Payer: COMMERCIAL

## 2025-05-06 DIAGNOSIS — G35 RELAPSING REMITTING MULTIPLE SCLEROSIS (HCC): ICD-10-CM

## 2025-05-06 LAB — CREAT BLD-MCNC: 1.1 MG/DL (ref 0.6–1.4)

## 2025-05-06 PROCEDURE — 6360000004 HC RX CONTRAST MEDICATION: Performed by: PSYCHIATRY & NEUROLOGY

## 2025-05-06 PROCEDURE — 72156 MRI NECK SPINE W/O & W/DYE: CPT

## 2025-05-06 PROCEDURE — A9579 GAD-BASE MR CONTRAST NOS,1ML: HCPCS | Performed by: PSYCHIATRY & NEUROLOGY

## 2025-05-06 PROCEDURE — 2500000003 HC RX 250 WO HCPCS: Performed by: PSYCHIATRY & NEUROLOGY

## 2025-05-06 PROCEDURE — 82565 ASSAY OF CREATININE: CPT

## 2025-05-06 RX ORDER — SODIUM CHLORIDE 0.9 % (FLUSH) 0.9 %
10 SYRINGE (ML) INJECTION PRN
Status: DISCONTINUED | OUTPATIENT
Start: 2025-05-06 | End: 2025-05-09 | Stop reason: HOSPADM

## 2025-05-06 RX ADMIN — GADOTERIDOL 18 ML: 279.3 INJECTION, SOLUTION INTRAVENOUS at 09:08

## 2025-05-06 RX ADMIN — SODIUM CHLORIDE, PRESERVATIVE FREE 10 ML: 5 INJECTION INTRAVENOUS at 08:48

## 2025-05-07 ENCOUNTER — RESULTS FOLLOW-UP (OUTPATIENT)
Dept: NEUROLOGY | Age: 60
End: 2025-05-07

## 2025-05-07 NOTE — RESULT ENCOUNTER NOTE
Please let the patient know that MRI cervical spine is showing lesions compatible with MS but not showing any new lesions.

## 2025-08-27 LAB — PROSTATE SPECIFIC ANTIGEN: 2.38 NG/ML
